# Patient Record
Sex: MALE | Race: WHITE | NOT HISPANIC OR LATINO | Employment: OTHER | URBAN - NONMETROPOLITAN AREA
[De-identification: names, ages, dates, MRNs, and addresses within clinical notes are randomized per-mention and may not be internally consistent; named-entity substitution may affect disease eponyms.]

---

## 2017-01-09 ENCOUNTER — TELEPHONE (OUTPATIENT)
Dept: FAMILY MEDICINE | Facility: OTHER | Age: 67
End: 2017-01-09

## 2017-01-09 NOTE — TELEPHONE ENCOUNTER
8:22 AM    Reason for Call: OVERBOOK    Patient is having the following symptoms: Swollen Finger for about a few days.    The patient is requesting an appointment for Tomorrow  with Dr. Velasco . PT is on the road for work and is on ly available tomorrow    Was an appointment offered for this call? Yes    Preferred method for responding to this message: Telephone Call PT's wife at  [phone]    If we cannot reach you directly, may we leave a detailed response at the number you provided? Yes    Can this message wait until your PCP/provider returns, if unavailable today? Not applicable, PCP is in    Valerie Karimi

## 2017-01-10 ENCOUNTER — OFFICE VISIT (OUTPATIENT)
Dept: FAMILY MEDICINE | Facility: OTHER | Age: 67
End: 2017-01-10
Attending: FAMILY MEDICINE
Payer: COMMERCIAL

## 2017-01-10 VITALS
SYSTOLIC BLOOD PRESSURE: 120 MMHG | BODY MASS INDEX: 26.48 KG/M2 | WEIGHT: 185 LBS | HEIGHT: 70 IN | HEART RATE: 66 BPM | TEMPERATURE: 96.6 F | DIASTOLIC BLOOD PRESSURE: 78 MMHG

## 2017-01-10 DIAGNOSIS — K50.019 CROHN'S DISEASE OF SMALL INTESTINE WITH COMPLICATION (H): ICD-10-CM

## 2017-01-10 DIAGNOSIS — M02.30 ACUTE REACTIVE ARTHRITIS (H): Primary | ICD-10-CM

## 2017-01-10 PROCEDURE — 99213 OFFICE O/P EST LOW 20 MIN: CPT | Performed by: FAMILY MEDICINE

## 2017-01-10 ASSESSMENT — PAIN SCALES - GENERAL: PAINLEVEL: NO PAIN (0)

## 2017-01-10 ASSESSMENT — ANXIETY QUESTIONNAIRES
GAD7 TOTAL SCORE: 0
1. FEELING NERVOUS, ANXIOUS, OR ON EDGE: NOT AT ALL
6. BECOMING EASILY ANNOYED OR IRRITABLE: NOT AT ALL
2. NOT BEING ABLE TO STOP OR CONTROL WORRYING: NOT AT ALL
7. FEELING AFRAID AS IF SOMETHING AWFUL MIGHT HAPPEN: NOT AT ALL
3. WORRYING TOO MUCH ABOUT DIFFERENT THINGS: NOT AT ALL
5. BEING SO RESTLESS THAT IT IS HARD TO SIT STILL: NOT AT ALL

## 2017-01-10 ASSESSMENT — PATIENT HEALTH QUESTIONNAIRE - PHQ9: 5. POOR APPETITE OR OVEREATING: NOT AT ALL

## 2017-01-10 NOTE — PROGRESS NOTES
"  SUBJECTIVE:                                                    Wilfrid Liao is a 66 year old male who presents to clinic today for the following health issues:      Musculoskeletal problem/pain      Duration: 5 days    Description  Location: left pointer finger    Intensity:  moderate    Accompanying signs and symptoms: swelling and redness    History  Previous similar problem: no   Previous evaluation:  none    Precipitating or alleviating factors:  Trauma or overuse: no   Aggravating factors include: none    Therapies tried and outcome: NSAID - advil    Pt has crohn's colitis     Started with mild stiffness and progressive got worse    No trauma but very active    Pt is right handed.    Pt is off Cimzia and will starting another injectable immunosupressor            Problem list and histories reviewed & adjusted, as indicated.  Additional history: as documented    Problem list, Medication list, Allergies, and Medical/Social/Surgical histories reviewed in EPIC and updated as appropriate.    ROS:  C: NEGATIVE for fever, chills, change in weight  R: NEGATIVE for significant cough or SOB  CV: NEGATIVE for chest pain, palpitations or peripheral edema    OBJECTIVE:                                                    /78 mmHg  Pulse 66  Temp(Src) 96.6  F (35.9  C)  Ht 5' 10\" (1.778 m)  Wt 185 lb (83.915 kg)  BMI 26.54 kg/m2  Body mass index is 26.54 kg/(m^2).   GENERAL: healthy, alert, well nourished, well hydrated, no distress  MS: extremities- left finger - tender and stiffness over PIP joint --no gross deformities noted, no edema         ASSESSMENT/PLAN:                                                        ICD-10-CM    1. Acute reactive arthritis (H) M02.30    2. Crohn's disease of small intestine with complication (H) K50.019    Discussed. Will try OTC NSAIDS.  Symptomatic treatment was discussed along when patient should call and/or come back into the clinic or go to ER/Urgent care. All questions " answered.   Discussed in length conservative measures of OTC medications for pain, Ice/Heat, elevation and the concept of R.I.C.E.. Continue behavioral changes and proper body mechanics to allow for healing. Follow up as directed.   If OTC fails- consider Indocin vs Medrol dose pack..       See Patient Instructions    Everett Velasco MD  [facility]

## 2017-01-10 NOTE — MR AVS SNAPSHOT
"              After Visit Summary   1/10/2017    Wilfrid Liao    MRN: [id number]           Patient Information     Date Of Birth          1950        Visit Information        Provider Department      1/10/2017 9:30 AM Everett Velasco MD [facility]        Today's Diagnoses     Acute reactive arthritis (H)    -  1     Crohn's disease of small intestine with complication (H)           Care Instructions    Call if not getting better...        Follow-ups after your visit        Who to contact     If you have questions or need follow up information about today's clinic visit or your schedule please contact [facility] directly at [phone].  Normal or non-critical lab and imaging results will be communicated to you by MyChart, letter or phone within 4 business days after the clinic has received the results. If you do not hear from us within 7 days, please contact the clinic through MyChart or phone. If you have a critical or abnormal lab result, we will notify you by phone as soon as possible.  Submit refill requests through [employer] or call your pharmacy and they will forward the refill request to us. Please allow 3 business days for your refill to be completed.          Additional Information About Your Visit        MyChart Information     Toovari lets you send messages to your doctor, view your test results, renew your prescriptions, schedule appointments and more. To sign up, go to [email or web address] . Click on \"Log in\" on the left side of the screen, which will take you to the Welcome page. Then click on \"Sign up Now\" on the right side of the page.     You will be asked to enter the access code listed below, as well as some personal information. Please follow the directions to create your username and password.     Your access code is: [email or web address]  Expires: 4/10/2017  9:45 AM     Your access code will  in 90 days. If you need help or a new code, please call your " "[facility] or [phone].        Care EveryWhere ID     This is your Care EveryWhere ID. This could be used by other organizations to access your [place] medical records  [id number]        Your Vitals Were     Pulse Temperature Height BMI (Body Mass Index)          66 96.6  F (35.9  C) 5' 10\" (1.778 m) 26.54 kg/m2         Blood Pressure from Last 3 Encounters:   01/10/17 120/78   02/05/16 120/70   12/24/15 137/85    Weight from Last 3 Encounters:   01/10/17 185 lb (83.915 kg)   02/05/16 200 lb (90.719 kg)   07/10/15 190 lb (86.183 kg)              Today, you had the following     No orders found for display         Today's Medication Changes          These changes are accurate as of: 1/10/17  9:45 AM.  If you have any questions, ask your nurse or doctor.               Stop taking these medicines if you haven't already. Please contact your care team if you have questions.     predniSONE 10 MG tablet   Commonly known as:  DELTASONE   Stopped by:  Everett Velasco MD                    Primary Care Provider Office Phone # Fax #    Everett Velasco [phone]       [address] [address]        Thank you!     Thank you for choosing [facility]  for your care. Our goal is always to provide you with excellent care. Hearing back from our patients is one way we can continue to improve our services. Please take a few minutes to complete the written survey that you may receive in the mail after your visit with us. Thank you!             Your Updated Medication List - Protect others around you: Learn how to safely use, store and throw away your medicines at www.disposemymeds.org.          This list is accurate as of: 1/10/17  9:45 AM.  Always use your most recent med list.                   Brand Name Dispense Instructions for use    COCONUT OIL PO          Curcumin Powd      1 tablet       cyanocobalamin 1000 MCG/ML injection    VITAMIN B12    1 mL    " Inject 1 mL (1,000 mcg) into the muscle every 30 days       lisinopril 20 MG tablet    PRINIVIL/ZESTRIL    90 tablet    TAKE ONE TABLET BY MOUTH ONCE DAILY       MULTIVITAMIN PO      Take 1 tablet by mouth daily.       PROBIOTIC FORMULA Caps      Take 1 tablet by mouth daily       VITAMIN D3 PO      Take 2,000 Units by mouth daily

## 2017-01-10 NOTE — NURSING NOTE
"Chief Complaint   Patient presents with     Arthritis       Initial /78 mmHg  Pulse 66  Temp(Src) 96.6  F (35.9  C)  Ht 5' 10\" (1.778 m)  Wt 185 lb (83.915 kg)  BMI 26.54 kg/m2 Estimated body mass index is 26.54 kg/(m^2) as calculated from the following:    Height as of this encounter: 5' 10\" (1.778 m).    Weight as of this encounter: 185 lb (83.915 kg).  BP completed using cuff size: jaida Ashton      "

## 2017-01-11 ASSESSMENT — ANXIETY QUESTIONNAIRES: GAD7 TOTAL SCORE: 0

## 2017-01-11 ASSESSMENT — PATIENT HEALTH QUESTIONNAIRE - PHQ9: SUM OF ALL RESPONSES TO PHQ QUESTIONS 1-9: 0

## 2017-02-01 ENCOUNTER — ALLIED HEALTH/NURSE VISIT (OUTPATIENT)
Dept: ALLERGY | Facility: OTHER | Age: 67
End: 2017-02-01
Attending: FAMILY MEDICINE
Payer: COMMERCIAL

## 2017-02-01 DIAGNOSIS — K50.019 CROHN'S DISEASE OF SMALL INTESTINE WITH COMPLICATION (H): Primary | ICD-10-CM

## 2017-02-01 PROCEDURE — 96372 THER/PROPH/DIAG INJ SC/IM: CPT

## 2017-02-01 NOTE — PROGRESS NOTES
The following medication was given:     MEDICATION: Vitamin B12  1000mcg  ROUTE: IM  SITE: Deltoid - Left  DOSE: 1 ml  LOT #: [id number]  :  Eat  EXPIRATION DATE:  05.2018  NDC#: 0517.0031.25  Rahel Mckeon

## 2017-02-24 ENCOUNTER — ALLIED HEALTH/NURSE VISIT (OUTPATIENT)
Dept: ALLERGY | Facility: OTHER | Age: 67
End: 2017-02-24
Attending: FAMILY MEDICINE
Payer: COMMERCIAL

## 2017-02-24 DIAGNOSIS — K50.019 CROHN'S DISEASE OF SMALL INTESTINE WITH COMPLICATION (H): Primary | ICD-10-CM

## 2017-02-24 PROCEDURE — 96372 THER/PROPH/DIAG INJ SC/IM: CPT

## 2017-02-24 NOTE — PROGRESS NOTES
The following medication was given:     MEDICATION: Vitamin B12  1000mcg  ROUTE: IM  SITE: Deltoid - Left  DOSE: 1000 mcg / 1 mL  LOT #: [id number]  :  [employer] [place]  EXPIRATION DATE:  05/2018  NDC#: 2415-7462-88    Joanne Max RN

## 2017-02-24 NOTE — MR AVS SNAPSHOT
"              After Visit Summary   2017    Wilfrid Liao    MRN: [id number]           Patient Information     Date Of Birth          1950        Visit Information        Provider Department      2017 1:00 PM HC SHOT ROOM [facility]        Today's Diagnoses     Crohn's disease of small intestine with complication (H)    -  1       Follow-ups after your visit        Who to contact     If you have questions or need follow up information about today's clinic visit or your schedule please contact [facility] directly at [phone].  Normal or non-critical lab and imaging results will be communicated to you by Medpricer.comhart, letter or phone within 4 business days after the clinic has received the results. If you do not hear from us within 7 days, please contact the clinic through [employer] or phone. If you have a critical or abnormal lab result, we will notify you by phone as soon as possible.  Submit refill requests through RingTu or call your pharmacy and they will forward the refill request to us. Please allow 3 business days for your refill to be completed.          Additional Information About Your Visit        MyChart Information     RingTu lets you send messages to your doctor, view your test results, renew your prescriptions, schedule appointments and more. To sign up, go to [email or web address] . Click on \"Log in\" on the left side of the screen, which will take you to the Welcome page. Then click on \"Sign up Now\" on the right side of the page.     You will be asked to enter the access code listed below, as well as some personal information. Please follow the directions to create your username and password.     Your access code is: [email or web address]  Expires: 4/10/2017  9:45 AM     Your access code will  in 90 days. If you need help or a new code, please call your [facility] or [phone].        Care EveryWhere ID     This is your Care EveryWhere ID. This could " be used by other organizations to access your [place] medical records  [id number]         Blood Pressure from Last 3 Encounters:   01/10/17 120/78   02/05/16 120/70   12/24/15 137/85    Weight from Last 3 Encounters:   01/10/17 185 lb (83.9 kg)   02/05/16 200 lb (90.7 kg)   07/10/15 190 lb (86.2 kg)              We Performed the Following     INJECTION INTRAMUSCULAR OR SUB-Q     VITAMIN B12 INJ /1000MCG        Primary Care Provider Office Phone # Fax #    Everett Velasco [phone]       [facility] [address]  [place] [place] [address]        Thank you!     Thank you for choosing [facility]  for your care. Our goal is always to provide you with excellent care. Hearing back from our patients is one way we can continue to improve our services. Please take a few minutes to complete the written survey that you may receive in the mail after your visit with us. Thank you!             Your Updated Medication List - Protect others around you: Learn how to safely use, store and throw away your medicines at www.disposemymeds.org.          This list is accurate as of: 2/24/17  1:15 PM.  Always use your most recent med list.                   Brand Name Dispense Instructions for use    COCONUT OIL PO          Curcumin Powd      1 tablet       cyanocobalamin 1000 MCG/ML injection    VITAMIN B12    1 mL    Inject 1 mL (1,000 mcg) into the muscle every 30 days       lisinopril 20 MG tablet    PRINIVIL/ZESTRIL    90 tablet    TAKE ONE TABLET BY MOUTH ONCE DAILY       MULTIVITAMIN PO      Take 1 tablet by mouth daily.       PROBIOTIC FORMULA Caps      Take 1 tablet by mouth daily       VITAMIN D3 PO      Take 2,000 Units by mouth daily

## 2017-03-01 ENCOUNTER — TELEPHONE (OUTPATIENT)
Dept: FAMILY MEDICINE | Facility: OTHER | Age: 67
End: 2017-03-01

## 2017-03-01 NOTE — TELEPHONE ENCOUNTER
8:17 AM    Reason for Call: OVERBOOK    Patient is having the following symptoms:  Swollen Finger for  1 week.    The patient is requesting an appointment for Thursday AM with  Dr. Velasco  Hoping to be seen around the same time as fis wife Lindy    Was an appointment offered for this call?  No    Preferred method for responding to this message: [phone]    If we cannot reach you directly, may we leave a detailed response at the number you provided? Yes      Nasreen Neil

## 2017-03-02 ENCOUNTER — OFFICE VISIT (OUTPATIENT)
Dept: FAMILY MEDICINE | Facility: OTHER | Age: 67
End: 2017-03-02
Attending: FAMILY MEDICINE
Payer: COMMERCIAL

## 2017-03-02 VITALS
TEMPERATURE: 96.8 F | HEIGHT: 70 IN | HEART RATE: 78 BPM | BODY MASS INDEX: 27.2 KG/M2 | WEIGHT: 190 LBS | DIASTOLIC BLOOD PRESSURE: 70 MMHG | SYSTOLIC BLOOD PRESSURE: 120 MMHG

## 2017-03-02 DIAGNOSIS — S69.92XA INJURY OF LEFT INDEX FINGER, INITIAL ENCOUNTER: Primary | ICD-10-CM

## 2017-03-02 DIAGNOSIS — M19.90 INFLAMMATORY ARTHRITIS: ICD-10-CM

## 2017-03-02 PROCEDURE — 73140 X-RAY EXAM OF FINGER(S): CPT | Mod: TC | Performed by: RADIOLOGY

## 2017-03-02 PROCEDURE — 99213 OFFICE O/P EST LOW 20 MIN: CPT | Performed by: FAMILY MEDICINE

## 2017-03-02 RX ORDER — PREDNISONE 20 MG/1
TABLET ORAL
Qty: 14 TABLET | Refills: 0 | Status: SHIPPED | OUTPATIENT
Start: 2017-03-02 | End: 2017-03-16

## 2017-03-02 ASSESSMENT — PAIN SCALES - GENERAL: PAINLEVEL: EXTREME PAIN (8)

## 2017-03-02 NOTE — NURSING NOTE
"Chief Complaint   Patient presents with     Hand Pain       Initial /70 (BP Location: Left arm, Patient Position: Chair, Cuff Size: Adult Regular)  Pulse 78  Temp 96.8  F (36  C)  Ht 5' 10\" (1.778 m)  Wt 190 lb (86.2 kg)  BMI 27.26 kg/m2 Estimated body mass index is 27.26 kg/(m^2) as calculated from the following:    Height as of this encounter: 5' 10\" (1.778 m).    Weight as of this encounter: 190 lb (86.2 kg).  Medication Reconciliation: complete     Francisco J Ashton      "

## 2017-03-02 NOTE — MR AVS SNAPSHOT
"              After Visit Summary   3/2/2017    Wilfrid Liao    MRN: [id number]           Patient Information     Date Of Birth          1950        Visit Information        Provider Department      3/2/2017 9:30 AM Everett Velasco MD [place] [facility]        Today's Diagnoses     Injury of left index finger, initial encounter    -  1    Inflammatory arthritis          Care Instructions    Call if not better.         Follow-ups after your visit        Who to contact     If you have questions or need follow up information about today's clinic visit or your schedule please contact [facility] [place] directly at [phone].  Normal or non-critical lab and imaging results will be communicated to you by GÃ¼dpodhart, letter or phone within 4 business days after the clinic has received the results. If you do not hear from us within 7 days, please contact the clinic through GÃ¼dpodhart or phone. If you have a critical or abnormal lab result, we will notify you by phone as soon as possible.  Submit refill requests through [employer] or call your pharmacy and they will forward the refill request to us. Please allow 3 business days for your refill to be completed.          Additional Information About Your Visit        MyChart Information     [employer] lets you send messages to your doctor, view your test results, renew your prescriptions, schedule appointments and more. To sign up, go to [email or web address] . Click on \"Log in\" on the left side of the screen, which will take you to the Welcome page. Then click on \"Sign up Now\" on the right side of the page.     You will be asked to enter the access code listed below, as well as some personal information. Please follow the directions to create your username and password.     Your access code is: [email or web address]  Expires: 4/10/2017  9:45 AM     Your access code will  in 90 days. If you need help or a new code, please call your [place] clinic or " "[phone].        Care EveryWhere ID     This is your Care EveryWhere ID. This could be used by other organizations to access your [place] medical records  [id number]        Your Vitals Were     Pulse Temperature Height BMI (Body Mass Index)          78 96.8  F (36  C) 5' 10\" (1.778 m) 27.26 kg/m2         Blood Pressure from Last 3 Encounters:   03/02/17 120/70   01/10/17 120/78   02/05/16 120/70    Weight from Last 3 Encounters:   03/02/17 190 lb (86.2 kg)   01/10/17 185 lb (83.9 kg)   02/05/16 200 lb (90.7 kg)              We Performed the Following     XR Finger Left G/E 2 Views     XR FINGER LT G/E 2 VW (Clinic Performed)     XR FINGER RT G/E 2 VW (Clinic Performed)          Today's Medication Changes          These changes are accurate as of: 3/2/17 11:00 AM.  If you have any questions, ask your nurse or doctor.               Start taking these medicines.        Dose/Directions    predniSONE 20 MG tablet   Commonly known as:  DELTASONE   Used for:  Injury of left index finger, initial encounter, Inflammatory arthritis   Started by:  Everett Velasco MD        1 tab bid for 4 days then 1 tab daily for 4 days and then 1/2 tab for 4 days.   Quantity:  14 tablet   Refills:  0            Where to get your medicines      These medications were sent to [id number] [address] [address] [address]   [address] , [place] [address]     Phone:  [phone]     predniSONE 20 MG tablet                Primary Care Provider Office Phone # Fax #    Everett Velasco [phone]       [facility] [address]  [place] [address]        Thank you!     Thank you for choosing [facility]  for your care. Our goal is always to provide you with excellent care. Hearing back from our patients is one way we can continue to improve our services. Please take a few minutes to complete the written survey that you may receive in the mail after your visit with us. Thank you!           "   Your Updated Medication List - Protect others around you: Learn how to safely use, store and throw away your medicines at www.disposemymeds.org.          This list is accurate as of: 3/2/17 11:00 AM.  Always use your most recent med list.                   Brand Name Dispense Instructions for use    COCONUT OIL PO          Curcumin Powd      1 tablet       cyanocobalamin 1000 MCG/ML injection    VITAMIN B12    1 mL    Inject 1 mL (1,000 mcg) into the muscle every 30 days       lisinopril 20 MG tablet    PRINIVIL/ZESTRIL    90 tablet    TAKE ONE TABLET BY MOUTH ONCE DAILY       MULTIVITAMIN PO      Take 1 tablet by mouth daily.       predniSONE 20 MG tablet    DELTASONE    14 tablet    1 tab bid for 4 days then 1 tab daily for 4 days and then 1/2 tab for 4 days.       PROBIOTIC FORMULA Caps      Take 1 tablet by mouth daily       VITAMIN D3 PO      Take 2,000 Units by mouth daily

## 2017-03-02 NOTE — PROGRESS NOTES
"  SUBJECTIVE:                                                    Wilfrid Liao is a 66 year old male who presents to clinic today for the following health issues:      Musculoskeletal problem/pain      Duration: months    Description  Location: left finger    Intensity:  moderate    Accompanying signs and symptoms: warmth and swelling    History  Previous similar problem: no   Previous evaluation:  none    Precipitating or alleviating factors:  Trauma or overuse: no   Aggravating factors include: none    Therapies tried and outcome: heat and ice    Has been on and off swollen and red/tender     Has Crohns- -reactive arthitis           Problem list and histories reviewed & adjusted, as indicated.  Additional history: as documented        Reviewed and updated as needed this visit by clinical staff  Tobacco  Allergies  Meds  Med Hx  Surg Hx  Fam Hx  Soc Hx      Reviewed and updated as needed this visit by Provider         ROS:  C: NEGATIVE for fever, chills, change in weight    OBJECTIVE:                                                    /70 (BP Location: Left arm, Patient Position: Chair, Cuff Size: Adult Regular)  Pulse 78  Temp 96.8  F (36  C)  Ht 5' 10\" (1.778 m)  Wt 190 lb (86.2 kg)  BMI 27.26 kg/m2  Body mass index is 27.26 kg/(m^2).   GENERAL: healthy, alert, well nourished, well hydrated, no distress  MS: left index finger  -  Verytender over PIP and mcp- swelling and decrease ROM , warm to touch. no gross deformities noted, no edema    XR- negative      ASSESSMENT/PLAN:                                                        ICD-10-CM    1. Injury of left index finger, initial encounter S69.92XA XR Finger Left G/E 2 Views     XR FINGER RT G/E 2 VW (Clinic Performed)     XR FINGER LT G/E 2 VW (Clinic Performed)     predniSONE (DELTASONE) 20 MG tablet   2. Inflammatory arthritis M19.90 predniSONE (DELTASONE) 20 MG tablet     Discussed -  Probable inflammatory arthritis from Crohns.  Risk and " benefits of steroid was discussed and verbal consent to proceed was given.   If fails - see ortho for injection . Symptomatic treatment was discussed along when patient should call and/or come back into the clinic or go to ER/Urgent care. All questions answered.   See Patient Instructions    Everett Velasco MD  [facility]

## 2017-03-14 ENCOUNTER — TELEPHONE (OUTPATIENT)
Dept: FAMILY MEDICINE | Facility: OTHER | Age: 67
End: 2017-03-14

## 2017-03-14 NOTE — TELEPHONE ENCOUNTER
8:27 AM    Reason for Call: OVERBOOK    Patient is having the following symptoms:  Bruising on surgery scar from coughing for   1 week    The patient is requesting an appointment for   Thursday or Friday  with  Dr. Velasco    Was an appointment offered for this call?  No    Preferred method for responding to this message: [phone]    If we cannot reach you directly, may we leave a detailed response at the number you provided?  Yes    Nasreen Neil

## 2017-03-16 ENCOUNTER — OFFICE VISIT (OUTPATIENT)
Dept: FAMILY MEDICINE | Facility: OTHER | Age: 67
End: 2017-03-16
Attending: FAMILY MEDICINE
Payer: COMMERCIAL

## 2017-03-16 VITALS
BODY MASS INDEX: 27.84 KG/M2 | SYSTOLIC BLOOD PRESSURE: 124 MMHG | HEART RATE: 81 BPM | OXYGEN SATURATION: 97 % | TEMPERATURE: 96.8 F | RESPIRATION RATE: 17 BRPM | DIASTOLIC BLOOD PRESSURE: 72 MMHG | WEIGHT: 194 LBS

## 2017-03-16 DIAGNOSIS — S39.011A ABDOMINAL MUSCLE STRAIN, INITIAL ENCOUNTER: ICD-10-CM

## 2017-03-16 DIAGNOSIS — J20.9 ACUTE BRONCHITIS, UNSPECIFIED ORGANISM: Primary | ICD-10-CM

## 2017-03-16 PROCEDURE — 99213 OFFICE O/P EST LOW 20 MIN: CPT | Performed by: FAMILY MEDICINE

## 2017-03-16 RX ORDER — AZITHROMYCIN 250 MG/1
TABLET, FILM COATED ORAL
Qty: 6 TABLET | Refills: 0 | Status: SHIPPED | OUTPATIENT
Start: 2017-03-16

## 2017-03-16 ASSESSMENT — PAIN SCALES - GENERAL: PAINLEVEL: NO PAIN (0)

## 2017-03-16 NOTE — MR AVS SNAPSHOT
"              After Visit Summary   3/16/2017    Wilfrid Liao    MRN: [id number]           Patient Information     Date Of Birth          1950        Visit Information        Provider Department      3/16/2017 11:00 AM Everett Velasco MD [facility]        Today's Diagnoses     Acute bronchitis, unspecified organism    -  1    Abdominal muscle strain, initial encounter          Care Instructions    Return if worsen         Follow-ups after your visit        Who to contact     If you have questions or need follow up information about today's clinic visit or your schedule please contact [facility] [place] directly at [phone].  Normal or non-critical lab and imaging results will be communicated to you by newScalehart, letter or phone within 4 business days after the clinic has received the results. If you do not hear from us within 7 days, please contact the clinic through newScalehart or phone. If you have a critical or abnormal lab result, we will notify you by phone as soon as possible.  Submit refill requests through [employer] or call your pharmacy and they will forward the refill request to us. Please allow 3 business days for your refill to be completed.          Additional Information About Your Visit        MyChart Information     Austin-Tetra lets you send messages to your doctor, view your test results, renew your prescriptions, schedule appointments and more. To sign up, go to [email or web address] . Click on \"Log in\" on the left side of the screen, which will take you to the Welcome page. Then click on \"Sign up Now\" on the right side of the page.     You will be asked to enter the access code listed below, as well as some personal information. Please follow the directions to create your username and password.     Your access code is: [email or web address]  Expires: 4/10/2017 10:45 AM     Your access code will  in 90 days. If you need help or a new code, please call your [facility] or " [phone].        Care EveryWhere ID     This is your Care EveryWhere ID. This could be used by other organizations to access your [place] medical records  [id number]        Your Vitals Were     Pulse Temperature Respirations Pulse Oximetry BMI (Body Mass Index)       81 96.8  F (36  C) 17 97% 27.84 kg/m2        Blood Pressure from Last 3 Encounters:   03/16/17 124/72   03/02/17 120/70   01/10/17 120/78    Weight from Last 3 Encounters:   03/16/17 194 lb (88 kg)   03/02/17 190 lb (86.2 kg)   01/10/17 185 lb (83.9 kg)              Today, you had the following     No orders found for display         Today's Medication Changes          These changes are accurate as of: 3/16/17 11:59 AM.  If you have any questions, ask your nurse or doctor.               Start taking these medicines.        Dose/Directions    azithromycin 250 MG tablet   Commonly known as:  ZITHROMAX   Used for:  Acute bronchitis, unspecified organism   Started by:  Everett Velasco MD        As directed   Quantity:  6 tablet   Refills:  0            Where to get your medicines      These medications were sent to [id number] - [place], [place] - [address]   [id number] , [place] [address]     Phone:  [phone]     azithromycin 250 MG tablet                Primary Care Provider Office Phone # Fax #    Everett Velasco [phone]       [facility] [address] [place] [address]  [place] [address]        Thank you!     Thank you for choosing [facility]  for your care. Our goal is always to provide you with excellent care. Hearing back from our patients is one way we can continue to improve our services. Please take a few minutes to complete the written survey that you may receive in the mail after your visit with us. Thank you!             Your Updated Medication List - Protect others around you: Learn how to safely use, store and throw away your medicines at www.disposemymeds.org.          This list is  accurate as of: 3/16/17 11:59 AM.  Always use your most recent med list.                   Brand Name Dispense Instructions for use    azithromycin 250 MG tablet    ZITHROMAX    6 tablet    As directed       COCONUT OIL PO          Curcumin Powd      1 tablet       cyanocobalamin 1000 MCG/ML injection    VITAMIN B12    1 mL    Inject 1 mL (1,000 mcg) into the muscle every 30 days       lisinopril 20 MG tablet    PRINIVIL/ZESTRIL    90 tablet    TAKE ONE TABLET BY MOUTH ONCE DAILY       MULTIVITAMIN PO      Take 1 tablet by mouth daily.       PROBIOTIC FORMULA Caps      Take 1 tablet by mouth daily       VITAMIN D (CHOLECALCIFEROL) PO      Take 2,000 Units by mouth daily

## 2017-03-16 NOTE — PROGRESS NOTES
SUBJECTIVE:                                                    Wilfrid Liao is a 66 year old male who presents to clinic today for the following health issues:        RESPIRATORY SYMPTOMS      Duration: 2 weeks     Description  nasal congestion, rhinorrhea, cough and bruising in chest     Severity: mild    Accompanying signs and symptoms: None    History (predisposing factors):  none    Precipitating or alleviating factors: None    Therapies tried and outcome:  robitussin     Area of lap ports for GB removal- large amount of bruising after terrible coughing spell    Before bruising - felt tearing sensation               Problem list and histories reviewed & adjusted, as indicated.  Additional history: as documented        ROS:  C: NEGATIVE for fever, chills, change in weight  CV: NEGATIVE for chest pain, palpitations or peripheral edema    OBJECTIVE:                                                    /72  Pulse 81  Temp 96.8  F (36  C)  Resp 17  Wt 194 lb (88 kg)  SpO2 97%  BMI 27.84 kg/m2  Body mass index is 27.84 kg/(m^2).   GENERAL: healthy, alert, well nourished, well hydrated, no distress  HENT: ear canals- normal; TMs- normal; Nose- normal; Mouth- no ulcers, no lesions  NECK: no tenderness, no adenopathy, no asymmetry, no masses, no stiffness; thyroid- normal to palpation  RESP: lungs not clear to auscultation - no rales, diffuse lower bilateral  rhonchi, no wheezes  Abdomen- bruising and hematoma size of golf ball over previous GB incisional port        ASSESSMENT/PLAN:                                                    (J20.9) Acute bronchitis, unspecified organism  (primary encounter diagnosis)  Comment: Will treat.   Plan: azithromycin (ZITHROMAX) 250 MG tablet        Symptomatic treatment was discussed along what is available for OTC medications for symptomatic relief.   Symptomatic treatment was discussed along when patient should call and/or come back into the clinic or go to ER/Urgent care.  All questions answered.       (S39.011A) Abdominal muscle strain, initial encounter  Comment: with hematoma- no hernia felt though still might be there   Plan: Symptomatic treatment was discussed along when patient should call and/or come back into the clinic or go to ER/Urgent care. All questions answered.           See Patient Instructions    Everett Velasco MD  [facility]

## 2017-03-16 NOTE — NURSING NOTE
"No chief complaint on file.      Initial /72  Pulse 81  Temp 96.8  F (36  C)  Resp 17  Wt 194 lb (88 kg)  SpO2 97%  BMI 27.84 kg/m2 Estimated body mass index is 27.84 kg/(m^2) as calculated from the following:    Height as of 3/2/17: 5' 10\" (1.778 m).    Weight as of this encounter: 194 lb (88 kg).  Medication Reconciliation: complete  "

## 2017-03-30 ENCOUNTER — OFFICE VISIT (OUTPATIENT)
Dept: CHIROPRACTIC MEDICINE | Facility: OTHER | Age: 67
End: 2017-03-30
Attending: CHIROPRACTOR
Payer: COMMERCIAL

## 2017-03-30 DIAGNOSIS — M99.02 SEGMENTAL AND SOMATIC DYSFUNCTION OF THORACIC REGION: ICD-10-CM

## 2017-03-30 DIAGNOSIS — M54.50 ACUTE BILATERAL LOW BACK PAIN WITHOUT SCIATICA: ICD-10-CM

## 2017-03-30 DIAGNOSIS — M99.03 SEGMENTAL AND SOMATIC DYSFUNCTION OF LUMBAR REGION: Primary | ICD-10-CM

## 2017-03-30 PROCEDURE — 98940 CHIROPRACT MANJ 1-2 REGIONS: CPT | Performed by: CHIROPRACTOR

## 2017-03-30 NOTE — MR AVS SNAPSHOT
"              After Visit Summary   3/30/2017    Wilfrid Liao    MRN: [id number]           Patient Information     Date Of Birth          1950        Visit Information        Provider Department      3/30/2017 4:10 PM Jose Marlow DC  [facility]        Today's Diagnoses     Segmental and somatic dysfunction of lumbar region    -  1    Acute bilateral low back pain without sciatica        Segmental and somatic dysfunction of thoracic region           Follow-ups after your visit        Who to contact     If you have questions or need follow up information about today's clinic visit or your schedule please [facility] at [phone].  Normal or non-critical lab and imaging results will be communicated to you by One Africa Mediahart, letter or phone within 4 business days after the clinic has received the results. If you do not hear from us within 7 days, please contact the clinic through One Africa Mediahart or phone. If you have a critical or abnormal lab result, we will notify you by phone as soon as possible.  Submit refill requests through Gre[employer] Software or call your pharmacy and they will forward the refill request to us. Please allow 3 business days for your refill to be completed.          Additional Information About Your Visit        MyChart Information     Greytip Software lets you send messages to your doctor, view your test results, renew your prescriptions, schedule appointments and more. To sign up, go to [email or web address] . Click on \"Log in\" on the left side of the screen, which will take you to the Welcome page. Then click on \"Sign up Now\" on the right side of the page.     You will be asked to enter the access code listed below, as well as some personal information. Please follow the directions to create your username and password.     Your access code is: [email or web address]  Expires: 4/10/2017 10:45 AM     Your access code will  in 90 days. If you need help or a new code, please call " your [place] clinic or [phone].        Care EveryWhere ID     This is your Care EveryWhere ID. This could be used by other organizations to access your [place] medical records  [id number]         Blood Pressure from Last 3 Encounters:   03/16/17 124/72   03/02/17 120/70   01/10/17 120/78    Weight from Last 3 Encounters:   03/16/17 194 lb (88 kg)   03/02/17 190 lb (86.2 kg)   01/10/17 185 lb (83.9 kg)              We Performed the Following     CHIROPRAC MANIP,SPINAL,1-2 REGIONS        Primary Care Provider Office Phone # Fax #    Everett Velasco [phone]       [facility] [address] [place] [address]        Thank you!     Thank you for choosing  [facility] [place] [facility]  for your care. Our goal is always to provide you with excellent care. Hearing back from our patients is one way we can continue to improve our services. Please take a few minutes to complete the written survey that you may receive in the mail after your visit with us. Thank you!             Your Updated Medication List - Protect others around you: Learn how to safely use, store and throw away your medicines at www.disposemymeds.org.          This list is accurate as of: 3/30/17 11:59 PM.  Always use your most recent med list.                   Brand Name Dispense Instructions for use    azithromycin 250 MG tablet    ZITHROMAX    6 tablet    As directed       COCONUT OIL PO          Curcumin Powd      1 tablet       cyanocobalamin 1000 MCG/ML injection    VITAMIN B12    1 mL    Inject 1 mL (1,000 mcg) into the muscle every 30 days       lisinopril 20 MG tablet    PRINIVIL/ZESTRIL    90 tablet    TAKE ONE TABLET BY MOUTH ONCE DAILY       MULTIVITAMIN PO      Take 1 tablet by mouth daily.       PROBIOTIC FORMULA Caps      Take 1 tablet by mouth daily       VITAMIN D (CHOLECALCIFEROL) PO      Take 2,000 Units by mouth daily

## 2017-04-03 NOTE — PROGRESS NOTES
Subjective Finding:    Chief compalint: Patient presents with:  Back Pain  , Pain Scale: 2/10, Intensity: dull and ache, Duration: 10 days, Change since last visit: , Radiating: no.    Date of injury:     Activities that the pain restricts:   Home/household/hobbies/social activities: no.  Work duties: no.  Sleep: no.  Makes symptoms better: rest.  Makes symptoms worse: thoracic extension, thoracic flexion and sitting for long periods.  Have you seen anyone else for the symptoms? No.  Work related: no.  Automobile related injury: no.    Objective and Assessment:    Posture Analysis:   High shoulder: right.  Head tilt: right.  High iliac crest: left.  Head carriage: forward.  Thoracic Kyphosis: neutral.  Lumbar Lordosis: forward.    Lumbar Range of Motion: extension decreased.  Cervical Range of Motion: flexion decreased and extension decreased.  Thoracic Range of Motion: .  Extremity Range of Motion: .    Palpation:   Lumbar pain      Segmental dysfunction pre-treatment:   T5     L4-5    Assessment post-treatment:  Cervical: ROM increased.  Thoracic: ROM increased and pain and tenderness decreased.  Lumbar: ROM increased.    Comments: .      Complicating Factors: .    Plan / Procedure:    Expected release date: .  Treatment plan: PRN.  Instructed patient: ice 20 minutes every other hour as needed and walk 10 minutes.  Short term goals: reduce pain.  Long term goals: restore normal function.  Prognosis: excellent.

## 2017-04-05 DIAGNOSIS — K50.019 CROHN'S DISEASE OF SMALL INTESTINE WITH COMPLICATION (H): ICD-10-CM

## 2017-04-05 DIAGNOSIS — K50.00 CROHN'S DISEASE OF SMALL INTESTINE (H): Primary | ICD-10-CM

## 2017-04-05 RX ORDER — CYANOCOBALAMIN 1000 UG/ML
INJECTION, SOLUTION INTRAMUSCULAR; SUBCUTANEOUS
Qty: 1 ML | Refills: 10 | OUTPATIENT
Start: 2017-04-05

## 2017-04-05 NOTE — TELEPHONE ENCOUNTER
Last office visit: 3.16.17 - Patient's B12 injection is .  Pended new order. Please review and sign if appropriate. Thank you

## 2017-04-06 ENCOUNTER — ALLIED HEALTH/NURSE VISIT (OUTPATIENT)
Dept: ALLERGY | Facility: OTHER | Age: 67
End: 2017-04-06
Attending: FAMILY MEDICINE
Payer: COMMERCIAL

## 2017-04-06 DIAGNOSIS — K50.019 CROHN'S DISEASE OF SMALL INTESTINE WITH COMPLICATION (H): Primary | ICD-10-CM

## 2017-04-06 PROCEDURE — 96372 THER/PROPH/DIAG INJ SC/IM: CPT

## 2017-04-06 NOTE — PROGRESS NOTES
Prior to injection verified patient identity using patient's name and date of birth.  Per orders of Dr. Velasco, injection of B12 given by Tita Salgado. Patient instructed to remain in clinic for 20 minutes afterwards, and to report any adverse reaction to me immediately. Patient signed waiver and left AMA.  Tita Salgado LPN

## 2017-04-06 NOTE — MR AVS SNAPSHOT
"              After Visit Summary   2017    Wilfrid Liao    MRN: [id number]           Patient Information     Date Of Birth          1950        Visit Information        Provider Department      2017 10:00 AM HC SHOT ROOM [facility]        Today's Diagnoses     Crohn's disease of small intestine with complication (H)    -  1       Follow-ups after your visit        Who to contact     If you have questions or need follow up information about today's clinic visit or your schedule please contact [facility] directly at [phone].  Normal or non-critical lab and imaging results will be communicated to you by Supersonichart, letter or phone within 4 business days after the clinic has received the results. If you do not hear from us within 7 days, please contact the clinic through ParLevel Systemst or phone. If you have a critical or abnormal lab result, we will notify you by phone as soon as possible.  Submit refill requests through TwitJump or call your pharmacy and they will forward the refill request to us. Please allow 3 business days for your refill to be completed.          Additional Information About Your Visit        MyChart Information     TwitJump lets you send messages to your doctor, view your test results, renew your prescriptions, schedule appointments and more. To sign up, go to [email or web address] . Click on \"Log in\" on the left side of the screen, which will take you to the Welcome page. Then click on \"Sign up Now\" on the right side of the page.     You will be asked to enter the access code listed below, as well as some personal information. Please follow the directions to create your username and password.     Your access code is: [email or web address]  Expires: 4/10/2017 10:45 AM     Your access code will  in 90 days. If you need help or a new code, please call your [facility] or [phone].        Care EveryWhere ID     This is your Care EveryWhere ID. This could " be used by other organizations to access your [place] medical records  [id number]         Blood Pressure from Last 3 Encounters:   03/16/17 124/72   03/02/17 120/70   01/10/17 120/78    Weight from Last 3 Encounters:   03/16/17 194 lb (88 kg)   03/02/17 190 lb (86.2 kg)   01/10/17 185 lb (83.9 kg)              Today, you had the following     No orders found for display       Primary Care Provider Office Phone # Fax #    Everett Velasco [phone]       [facility] [address] [facility] [place] [address]        Thank you!     Thank you for choosing [facility]  for your care. Our goal is always to provide you with excellent care. Hearing back from our patients is one way we can continue to improve our services. Please take a few minutes to complete the written survey that you may receive in the mail after your visit with us. Thank you!             Your Updated Medication List - Protect others around you: Learn how to safely use, store and throw away your medicines at www.disposemymeds.org.          This list is accurate as of: 4/6/17 10:18 AM.  Always use your most recent med list.                   Brand Name Dispense Instructions for use    azithromycin 250 MG tablet    ZITHROMAX    6 tablet    As directed       COCONUT OIL PO          Curcumin Powd      1 tablet       cyanocobalamin 1000 MCG/ML injection    VITAMIN B12    1 mL    Inject 1 mL (1,000 mcg) into the muscle every 30 days       lisinopril 20 MG tablet    PRINIVIL/ZESTRIL    90 tablet    TAKE ONE TABLET BY MOUTH ONCE DAILY       MULTIVITAMIN PO      Take 1 tablet by mouth daily.       PROBIOTIC FORMULA Caps      Take 1 tablet by mouth daily       VITAMIN D (CHOLECALCIFEROL) PO      Take 2,000 Units by mouth daily

## 2017-04-06 NOTE — NURSING NOTE
The following medication was given:     MEDICATION: Vitamin B12  1000mcg  ROUTE: IM  SITE: Deltoid - Left  DOSE: 1 ml  LOT #: [id number]  :  [employer]  EXPIRATION DATE:  7/2018  NDC#: 8085-1010-55  Injection was given in Left arm again as right arm had a rash.  Tita Salgado LPN

## 2017-05-03 ENCOUNTER — ALLIED HEALTH/NURSE VISIT (OUTPATIENT)
Dept: ALLERGY | Facility: OTHER | Age: 67
End: 2017-05-03
Attending: FAMILY MEDICINE
Payer: COMMERCIAL

## 2017-05-03 ENCOUNTER — OFFICE VISIT (OUTPATIENT)
Dept: CHIROPRACTIC MEDICINE | Facility: OTHER | Age: 67
End: 2017-05-03
Attending: CHIROPRACTOR
Payer: COMMERCIAL

## 2017-05-03 DIAGNOSIS — M99.01 SEGMENTAL AND SOMATIC DYSFUNCTION OF CERVICAL REGION: ICD-10-CM

## 2017-05-03 DIAGNOSIS — M99.03 SEGMENTAL AND SOMATIC DYSFUNCTION OF LUMBAR REGION: Primary | ICD-10-CM

## 2017-05-03 DIAGNOSIS — M54.50 ACUTE BILATERAL LOW BACK PAIN WITHOUT SCIATICA: ICD-10-CM

## 2017-05-03 DIAGNOSIS — M99.02 SEGMENTAL AND SOMATIC DYSFUNCTION OF THORACIC REGION: ICD-10-CM

## 2017-05-03 DIAGNOSIS — K50.019 CROHN'S DISEASE OF SMALL INTESTINE WITH COMPLICATION (H): Primary | ICD-10-CM

## 2017-05-03 PROCEDURE — 96372 THER/PROPH/DIAG INJ SC/IM: CPT

## 2017-05-03 PROCEDURE — 98941 CHIROPRACT MANJ 3-4 REGIONS: CPT | Performed by: CHIROPRACTOR

## 2017-05-03 NOTE — MR AVS SNAPSHOT
"              After Visit Summary   5/3/2017    Wilfrid Liao    MRN: [id number]           Patient Information     Date Of Birth          1950        Visit Information        Provider Department      5/3/2017 10:10 AM [facility]        Today's Diagnoses     Segmental and somatic dysfunction of lumbar region    -  1    Acute bilateral low back pain without sciatica        Segmental and somatic dysfunction of thoracic region        Segmental and somatic dysfunction of cervical region           Follow-ups after your visit        Who to contact     If you have questions or need follow up information about today's clinic visit or your schedule please contact  [facility] directly at [phone].  Normal or non-critical lab and imaging results will be communicated to you by [employer] Building Maintenancehart, letter or phone within 4 business days after the clinic has received the results. If you do not hear from us within 7 days, please contact the clinic through MyChart or phone. If you have a critical or abnormal lab result, we will notify you by phone as soon as possible.  Submit refill requests through Programeter or call your pharmacy and they will forward the refill request to us. Please allow 3 business days for your refill to be completed.          Additional Information About Your Visit        MyChart Information     Programeter lets you send messages to your doctor, view your test results, renew your prescriptions, schedule appointments and more. To sign up, go to [email or web address] . Click on \"Log in\" on the left side of the screen, which will take you to the Welcome page. Then click on \"Sign up Now\" on the right side of the page.     You will be asked to enter the access code listed below, as well as some personal information. Please follow the directions to create your username and password.     Your access code is: [email or web address]  Expires: 8/1/2017  9:18 AM     Your access code will "  in 90 days. If you need help or a new code, please call your [place] clinic or [phone].        Care EveryWhere ID     This is your Care EveryWhere ID. This could be used by other organizations to access your [place] medical records  [id number]         Blood Pressure from Last 3 Encounters:   17 124/72   17 120/70   01/10/17 120/78    Weight from Last 3 Encounters:   17 194 lb (88 kg)   17 190 lb (86.2 kg)   01/10/17 185 lb (83.9 kg)              We Performed the Following     CHIROPRAC MANIP,SPINAL,3-4 REGIONS        Primary Care Provider Office Phone # Fax #    Everett Velasco [phone]       [facility] [address] [facility]  [facility] [address]        Thank you!     [facility] you [facility]  [facility]  [facility]. Our goal is always to provide you with excellent care. Hearing back from our patients is one way we can continue to improve our services. Please take a few minutes to complete the written survey that you may receive in the mail after your visit with us. Thank you!             Your Updated Medication List - Protect others around you: Learn how to safely use, store and throw away your medicines at www.disposemymeds.org.          This list is accurate as of: 5/3/17 11:59 PM.  Always use your most recent med list.                   Brand Name Dispense Instructions for use    azithromycin 250 MG tablet    ZITHROMAX    6 tablet    As directed       COCONUT OIL PO          Curcumin Powd      1 tablet       cyanocobalamin 1000 MCG/ML injection    VITAMIN B12    1 mL    Inject 1 mL (1,000 mcg) into the muscle every 30 days       lisinopril 20 MG tablet    PRINIVIL/ZESTRIL    90 tablet    TAKE ONE TABLET BY MOUTH ONCE DAILY       MULTIVITAMIN PO      Take 1 tablet by mouth daily.       PROBIOTIC FORMULA Caps      Take 1 tablet by mouth daily       VITAMIN D (CHOLECALCIFEROL) PO      Take 2,000 Units by mouth daily

## 2017-05-03 NOTE — MR AVS SNAPSHOT
"              After Visit Summary   5/3/2017    Wilfrid Liao    MRN: [id number]           Patient Information     Date Of Birth          1950        Visit Information        Provider Department      5/3/2017 9:00 AM HC SHOT ROOM [facility]        Today's Diagnoses     Crohn's disease of small intestine with complication (H)    -  1       Follow-ups after your visit        Your next 10 appointments already scheduled     May 03, 2017 10:10 AM CDT   Return Visit with Jose Marlow DC   [facility] ([facility])    [address]  [facility] [address]   [phone]              Who to contact     If you have questions or need follow up information about today's clinic visit or your schedule please contact [facility] directly at [phone].  Normal or non-critical lab and imaging results will be communicated to you by MyChart, letter or phone within 4 business days after the clinic has received the results. If you do not hear from us within 7 days, please contact the clinic through MyChart or phone. If you have a critical or abnormal lab result, we will notify you by phone as soon as possible.  Submit refill requests through [employer] or call your pharmacy and they will forward the refill request to us. Please allow 3 business days for your refill to be completed.          Additional Information About Your Visit        MyChart Information     [employer] lets you send messages to your doctor, view your test results, renew your prescriptions, schedule appointments and more. To sign up, go to [email or web address] . Click on \"Log in\" on the left side of the screen, which will take you to the Welcome page. Then click on \"Sign up Now\" on the right side of the page.     You will be asked to enter the access code listed below, as well as some personal information. Please follow the directions to create your username and password.     Your access code is: " [email or web address]  Expires: 2017  9:18 AM     Your access code will  in 90 days. If you need help or a new code, please call your [place] clinic or [phone].        Care EveryWhere ID     This is your Care EveryWhere ID. This could be used by other organizations to access your [place] medical records  [id number]         Blood Pressure from Last 3 Encounters:   17 124/72   17 120/70   01/10/17 120/78    Weight from Last 3 Encounters:   17 194 lb (88 kg)   17 190 lb (86.2 kg)   01/10/17 185 lb (83.9 kg)              We Performed the Following     INJECTION INTRAMUSCULAR OR SUB-Q     Medroxyprogesterone inj  1mg   (Depo Provera J-Code)        Primary Care Provider Office Phone # Fax #    Everett Velasco [phone]       [address]  [place] [address]        Thank you!     Thank you for choosing [facility]  for your care. Our goal is always to provide you with excellent care. Hearing back from our patients is one way we can continue to improve our services. Please take a few minutes to complete the written survey that you may receive in the mail after your visit with us. Thank you!             Your Updated Medication List - Protect others around you: Learn how to safely use, store and throw away your medicines at www.disposemymeds.org.          This list is accurate as of: 5/3/17  9:18 AM.  Always use your most recent med list.                   Brand Name Dispense Instructions for use    azithromycin 250 MG tablet    ZITHROMAX    6 tablet    As directed       COCONUT OIL PO          Curcumin Powd      1 tablet       cyanocobalamin 1000 MCG/ML injection    VITAMIN B12    1 mL    Inject 1 mL (1,000 mcg) into the muscle every 30 days       lisinopril 20 MG tablet    PRINIVIL/ZESTRIL    90 tablet    TAKE ONE TABLET BY MOUTH ONCE DAILY       MULTIVITAMIN PO      Take 1 tablet by mouth daily.       PROBIOTIC FORMULA Caps      Take  1 tablet by mouth daily       VITAMIN D (CHOLECALCIFEROL) PO      Take 2,000 Units by mouth daily

## 2017-05-03 NOTE — PROGRESS NOTES
The following medication was given:     MEDICATION: Vitamin B12  1000mcg  ROUTE: IM  SITE: Deltoid - Right  DOSE: 1ml  LOT #: [id number]  :  [employer]  EXPIRATION DATE:  07/2018  NDC#: 9090-7001-33    Roxann Combs LPN

## 2017-05-03 NOTE — NURSING NOTE
Prior to injection verified patient identity using patient's name and date of birth.  Per orders of Dr. cole, injection of B-12 given by Roxann Combs. Patient instructed to remain in clinic for 20 minutes afterwards, and to report any adverse reaction to me immediately.      Roxann Combs LPN

## 2017-05-04 NOTE — PROGRESS NOTES
Subjective Finding:    Chief compalint: Patient presents with:  Back Pain  Neck Pain  , Pain Scale: 2/10, Intensity: dull and ache, Duration: 10 days, Change since last visit: , Radiating: no.    Date of injury:     Activities that the pain restricts:   Home/household/hobbies/social activities: no.  Work duties: no.  Sleep: no.  Makes symptoms better: rest.  Makes symptoms worse: thoracic extension, thoracic flexion and sitting for long periods.  Have you seen anyone else for the symptoms? No.  Work related: no.  Automobile related injury: no.    Objective and Assessment:    Posture Analysis:   High shoulder: right.  Head tilt: right.  High iliac crest: left.  Head carriage: forward.  Thoracic Kyphosis: neutral.  Lumbar Lordosis: forward.    Lumbar Range of Motion: extension decreased.  Cervical Range of Motion: flexion decreased and extension decreased.  Thoracic Range of Motion: .  Extremity Range of Motion: .    Palpation:   Lumbar pain      Segmental dysfunction pre-treatment:   T5     L4-5  C56    Assessment post-treatment:  Cervical: ROM increased.  Thoracic: ROM increased and pain and tenderness decreased.  Lumbar: ROM increased.    Comments: .      Complicating Factors: .    Plan / Procedure:    Expected release date: .  Treatment plan: PRN.  Instructed patient: ice 20 minutes every other hour as needed and walk 10 minutes.  Short term goals: reduce pain.  Long term goals: restore normal function.  Prognosis: excellent.

## 2017-05-10 ENCOUNTER — OFFICE VISIT (OUTPATIENT)
Dept: CHIROPRACTIC MEDICINE | Facility: OTHER | Age: 67
End: 2017-05-10
Attending: CHIROPRACTOR
Payer: COMMERCIAL

## 2017-05-10 DIAGNOSIS — M99.02 SEGMENTAL AND SOMATIC DYSFUNCTION OF THORACIC REGION: ICD-10-CM

## 2017-05-10 DIAGNOSIS — M54.2 CERVICALGIA: ICD-10-CM

## 2017-05-10 DIAGNOSIS — M99.03 SEGMENTAL AND SOMATIC DYSFUNCTION OF LUMBAR REGION: ICD-10-CM

## 2017-05-10 DIAGNOSIS — M99.01 SEGMENTAL AND SOMATIC DYSFUNCTION OF CERVICAL REGION: Primary | ICD-10-CM

## 2017-05-10 PROCEDURE — 98940 CHIROPRACT MANJ 1-2 REGIONS: CPT | Performed by: CHIROPRACTOR

## 2017-05-10 NOTE — MR AVS SNAPSHOT
"              After Visit Summary   5/10/2017    Wilfrid Liao    MRN: [id number]           Patient Information     Date Of Birth          1950        Visit Information        Provider Department      5/10/2017 10:40 AM Jose Marlow DC  [facility] [place]        Today's Diagnoses     Segmental and somatic dysfunction of cervical region    -  1    Cervicalgia        Segmental and somatic dysfunction of lumbar region        Segmental and somatic dysfunction of thoracic region           Follow-ups after your visit        Who to contact     If you have questions or need follow up information about today's clinic visit or your schedule please contact  [facility] [place] directly at [phone].  Normal or non-critical lab and imaging results will be communicated to you by [employer], letter or phone within 4 business days after the clinic has received the results. If you do not hear from us within 7 days, please contact the clinic through [employer] or phone. If you have a critical or abnormal lab result, we will notify you by phone as soon as possible.  Submit refill requests through [employer] or call your pharmacy and they will forward the refill request to us. Please allow 3 business days for your refill to be completed.          Additional Information About Your Visit        MyChart Information     [employer] lets you send messages to your doctor, view your test results, renew your prescriptions, schedule appointments and more. To sign up, go to [email or web address] . Click on \"Log in\" on the left side of the screen, which will take you to the Welcome page. Then click on \"Sign up Now\" on the right side of the page.     You will be asked to enter the access code listed below, as well as some personal information. Please follow the directions to create your username and password.     Your access code is: [email or web address]  Expires: 2017  9:18 AM     Your access code will  in 90 days. If you need help " or a new code, please call your [place] clinic or [phone].        Care EveryWhere ID     This is your Care EveryWhere ID. This could be used by other organizations to access your [place] medical records  [id number]         Blood Pressure from Last 3 Encounters:   03/16/17 124/72   03/02/17 120/70   01/10/17 120/78    Weight from Last 3 Encounters:   03/16/17 194 lb (88 kg)   03/02/17 190 lb (86.2 kg)   01/10/17 185 lb (83.9 kg)              We Performed the Following     CHIROPRAC MANIP,SPINAL,1-2 REGIONS        Primary Care Provider Office Phone # Fax #    Everett Velasco [phone]       [facility] [address] [place] [address]        Thank you!     Thank you for choosing  [facility] [place] [facility]  for your care. Our goal is always to provide you with excellent care. Hearing back from our patients is one way we can continue to improve our services. Please take a few minutes to complete the written survey that you may receive in the mail after your visit with us. Thank you!             Your Updated Medication List - Protect others around you: Learn how to safely use, store and throw away your medicines at www.disposemymeds.org.          This list is accurate as of: 5/10/17  1:54 PM.  Always use your most recent med list.                   Brand Name Dispense Instructions for use    azithromycin 250 MG tablet    ZITHROMAX    6 tablet    As directed       COCONUT OIL PO          Curcumin Powd      1 tablet       cyanocobalamin 1000 MCG/ML injection    VITAMIN B12    1 mL    Inject 1 mL (1,000 mcg) into the muscle every 30 days       lisinopril 20 MG tablet    PRINIVIL/ZESTRIL    90 tablet    TAKE ONE TABLET BY MOUTH ONCE DAILY       MULTIVITAMIN PO      Take 1 tablet by mouth daily.       PROBIOTIC FORMULA Caps      Take 1 tablet by mouth daily       VITAMIN D (CHOLECALCIFEROL) PO      Take 2,000 Units by mouth daily

## 2017-05-10 NOTE — PROGRESS NOTES
Subjective Finding:    Chief compalint: Patient presents with:  Neck Pain: with headaches  Back Pain  , Pain Scale: 2/10, Intensity: dull and ache, Duration: 10 days, Change since last visit: , Radiating: no.    Date of injury:     Activities that the pain restricts:   Home/household/hobbies/social activities: no.  Work duties: no.  Sleep: no.  Makes symptoms better: rest.  Makes symptoms worse: thoracic extension, thoracic flexion and sitting for long periods.  Have you seen anyone else for the symptoms? No.  Work related: no.  Automobile related injury: no.    Objective and Assessment:    Posture Analysis:   High shoulder: right.  Head tilt: right.  High iliac crest: left.  Head carriage: forward.  Thoracic Kyphosis: neutral.  Lumbar Lordosis: forward.    Lumbar Range of Motion: extension decreased.  Cervical Range of Motion: flexion decreased and extension decreased.  Thoracic Range of Motion: .  Extremity Range of Motion: .    Palpation:   Lumbar pain      Segmental dysfunction pre-treatment:   T5     L4-5  C56    Assessment post-treatment:  Cervical: ROM increased.  Thoracic: ROM increased and pain and tenderness decreased.  Lumbar: ROM increased.    Comments: .      Complicating Factors: .    Plan / Procedure:    Expected release date: .  Treatment plan: PRN.  Instructed patient: ice 20 minutes every other hour as needed and walk 10 minutes.  Short term goals: reduce pain.  Long term goals: restore normal function.  Prognosis: excellent.

## 2017-05-23 ENCOUNTER — OFFICE VISIT (OUTPATIENT)
Dept: CHIROPRACTIC MEDICINE | Facility: OTHER | Age: 67
End: 2017-05-23
Attending: CHIROPRACTOR
Payer: COMMERCIAL

## 2017-05-23 DIAGNOSIS — M99.01 SEGMENTAL AND SOMATIC DYSFUNCTION OF CERVICAL REGION: ICD-10-CM

## 2017-05-23 DIAGNOSIS — M54.50 ACUTE BILATERAL LOW BACK PAIN WITHOUT SCIATICA: ICD-10-CM

## 2017-05-23 DIAGNOSIS — M99.03 SEGMENTAL AND SOMATIC DYSFUNCTION OF LUMBAR REGION: Primary | ICD-10-CM

## 2017-05-23 DIAGNOSIS — M99.02 SEGMENTAL AND SOMATIC DYSFUNCTION OF THORACIC REGION: ICD-10-CM

## 2017-05-23 PROCEDURE — 98941 CHIROPRACT MANJ 3-4 REGIONS: CPT | Performed by: CHIROPRACTOR

## 2017-05-23 NOTE — MR AVS SNAPSHOT
"              After Visit Summary   2017    Wilfrid Liao    MRN: [id number]           Patient Information     Date Of Birth          1950        Visit Information        Provider Department      2017 12:00 PM Jose Marlow DC [facility]         Follow-ups after your visit        Who to contact     If you have questions or need follow up information about today's clinic visit or your schedule please contact  [facility] [place] [facility] directly at [phone].  Normal or non-critical lab and imaging results will be communicated to you by [employer], letter or phone within 4 business days after the clinic has received the results. If you do not hear from us within 7 days, please contact the clinic through [employer] or phone. If you have a critical or abnormal lab result, we will notify you by phone as soon as possible.  Submit refill requests through [employer] or call your pharmacy and they will forward the refill request to us. Please allow 3 business days for your refill to be completed.          Additional Information About Your Visit        [employer] Information     [employer] lets you send messages to your doctor, view your test results, renew your prescriptions, schedule appointments and more. To sign up, go to [email or web address] . Click on \"Log in\" on the left side of the screen, which will take you to the Welcome page. Then click on \"Sign up Now\" on the right side of the page.     You will be asked to enter the access code listed below, as well as some personal information. Please follow the directions to create your username and password.     Your access code is: [email or web address]  Expires: 2017  9:18 AM     Your access code will  in 90 days. If you need help or a new code, please call your [place] clinic or [phone].        Care EveryWhere ID     This is your Care EveryWhere ID. This could be used by other organizations to access your [place] medical records  [id number]   "       Blood Pressure from Last 3 Encounters:   03/16/17 124/72   03/02/17 120/70   01/10/17 120/78    Weight from Last 3 Encounters:   03/16/17 194 lb (88 kg)   03/02/17 190 lb (86.2 kg)   01/10/17 185 lb (83.9 kg)              Today, you had the following     No orders found for display       Primary Care Provider Office Phone # Fax #    Everett Velasco [phone]       [facility] [address]  [place] [address]        Thank you!     Thank you for choosing  [facility] [place] [facility]  for your care. Our goal is always to provide you with excellent care. Hearing back from our patients is one way we can continue to improve our services. Please take a few minutes to complete the written survey that you may receive in the mail after your visit with us. Thank you!             Your Updated Medication List - Protect others around you: Learn how to safely use, store and throw away your medicines at www.disposemymeds.org.          This list is accurate as of: 5/23/17 11:59 PM.  Always use your most recent med list.                   Brand Name Dispense Instructions for use    azithromycin 250 MG tablet    ZITHROMAX    6 tablet    As directed       COCONUT OIL PO          Curcumin Powd      1 tablet       cyanocobalamin 1000 MCG/ML injection    VITAMIN B12    1 mL    Inject 1 mL (1,000 mcg) into the muscle every 30 days       lisinopril 20 MG tablet    PRINIVIL/ZESTRIL    90 tablet    TAKE ONE TABLET BY MOUTH ONCE DAILY       MULTIVITAMIN PO      Take 1 tablet by mouth daily.       PROBIOTIC FORMULA Caps      Take 1 tablet by mouth daily       VITAMIN D (CHOLECALCIFEROL) PO      Take 2,000 Units by mouth daily

## 2017-05-24 NOTE — PROGRESS NOTES
Subjective Finding:    Chief compalint: Patient presents with:  Back Pain  , Pain Scale: 2/10, Intensity: dull and ache, Duration: 10 days, Change since last visit: , Radiating: no.    Date of injury:     Activities that the pain restricts:   Home/household/hobbies/social activities: no.  Work duties: no.  Sleep: no.  Makes symptoms better: rest.  Makes symptoms worse: thoracic extension, thoracic flexion and sitting for long periods.  Have you seen anyone else for the symptoms? No.  Work related: no.  Automobile related injury: no.    Objective and Assessment:    Posture Analysis:   High shoulder: right.  Head tilt: right.  High iliac crest: left.  Head carriage: forward.  Thoracic Kyphosis: neutral.  Lumbar Lordosis: forward.    Lumbar Range of Motion: extension decreased.  Cervical Range of Motion: flexion decreased and extension decreased.  Thoracic Range of Motion: .  Extremity Range of Motion: .    Palpation:   Lumbar pain      Segmental dysfunction pre-treatment:   T5     L4-5  C56    Assessment post-treatment:  Cervical: ROM increased.  Thoracic: ROM increased and pain and tenderness decreased.  Lumbar: ROM increased.    Comments: .      Complicating Factors: .    Plan / Procedure:    Expected release date: .  Treatment plan: PRN.  Instructed patient: ice 20 minutes every other hour as needed and walk 10 minutes.  Short term goals: reduce pain.  Long term goals: restore normal function.  Prognosis: excellent.

## 2017-05-31 ENCOUNTER — ALLIED HEALTH/NURSE VISIT (OUTPATIENT)
Dept: ALLERGY | Facility: OTHER | Age: 67
End: 2017-05-31
Attending: FAMILY MEDICINE
Payer: COMMERCIAL

## 2017-05-31 DIAGNOSIS — K50.90 CROHN'S DISEASE (H): Primary | ICD-10-CM

## 2017-05-31 PROCEDURE — 96372 THER/PROPH/DIAG INJ SC/IM: CPT

## 2017-05-31 NOTE — MR AVS SNAPSHOT
"              After Visit Summary   2017    Wilfrid Liao    MRN: [id number]           Patient Information     Date Of Birth          1950        Visit Information        Provider Department      2017 11:15 AM HC SHOT ROOM [facility]        Today's Diagnoses     Crohn's disease (H)    -  1       Follow-ups after your visit        Who to contact     If you have questions or need follow up information about today's clinic visit or your schedule please [facility] at [phone].  Normal or non-critical lab and imaging results will be communicated to you by MyChart, letter or phone within 4 business days after the clinic has received the results. If you do not hear from us within 7 days, please contact the clinic through [employer] or phone. If you have a critical or abnormal lab result, we will notify you by phone as soon as possible.  Submit refill requests through [employer] or call your pharmacy and they will forward the refill request to us. Please allow 3 business days for your refill to be completed.          Additional Information About Your Visit        MyChart Information     [employer] lets you send messages to your doctor, view your test results, renew your prescriptions, schedule appointments and more. To sign up, go to [email or web address] . Click on \"Log in\" on the left side of the screen, which will take you to the Welcome page. Then click on \"Sign up Now\" on the right side of the page.     You will be asked to enter the access code listed below, as well as some personal information. Please follow the directions to create your username and password.     Your access code is: [email or web address]  Expires: 2017  9:18 AM     Your access code will  in 90 days. If you need help or a new code, please call your [facility] or [phone].        Care EveryWhere ID     This is your Care EveryWhere ID. This could be used by other organizations to " access your [place] medical records  [id number]         Blood Pressure from Last 3 Encounters:   03/16/17 124/72   03/02/17 120/70   01/10/17 120/78    Weight from Last 3 Encounters:   03/16/17 194 lb (88 kg)   03/02/17 190 lb (86.2 kg)   01/10/17 185 lb (83.9 kg)              We Performed the Following     INJECTION INTRAMUSCULAR OR SUB-Q     VITAMIN B12 INJ /1000MCG        Primary Care Provider Office Phone # Fax #    Everett Velasco [phone]       [facility] [address] [place] [address]        Thank you!     Thank you for choosing [facility]  for your care. Our goal is always to provide you with excellent care. Hearing back from our patients is one way we can continue to improve our services. Please take a few minutes to complete the written survey that you may receive in the mail after your visit with us. Thank you!             Your Updated Medication List - Protect others around you: Learn how to safely use, store and throw away your medicines at www.disposemymeds.org.          This list is accurate as of: 5/31/17 11:58 AM.  Always use your most recent med list.                   Brand Name Dispense Instructions for use    azithromycin 250 MG tablet    ZITHROMAX    6 tablet    As directed       COCONUT OIL PO          Curcumin Powd      1 tablet       cyanocobalamin 1000 MCG/ML injection    VITAMIN B12    1 mL    Inject 1 mL (1,000 mcg) into the muscle every 30 days       lisinopril 20 MG tablet    PRINIVIL/ZESTRIL    90 tablet    TAKE ONE TABLET BY MOUTH ONCE DAILY       MULTIVITAMIN PO      Take 1 tablet by mouth daily.       PROBIOTIC FORMULA Caps      Take 1 tablet by mouth daily       VITAMIN D (CHOLECALCIFEROL) PO      Take 2,000 Units by mouth daily

## 2017-05-31 NOTE — NURSING NOTE
MEDICATION: Vitamin B12  1000mcg  ROUTE: IM  SITE: Deltoid - Left  DOSE: 1000mcg  LOT #: [id number]  :  [employer]  EXPIRATION DATE:  07/18  NDC#: 1778-5312-54  IRINA TAVAREZ    Pt received injection 2 days per Francisco J Ashton LPN approval due to pt traveling for work.  IRINA TAVAREZ

## 2017-06-15 ENCOUNTER — TRANSFERRED RECORDS (OUTPATIENT)
Dept: HEALTH INFORMATION MANAGEMENT | Facility: HOSPITAL | Age: 67
End: 2017-06-15

## 2017-06-15 LAB
ALT SERPL-CCNC: 64 U/L (ref 7–55)
AST SERPL-CCNC: 47 U/L (ref 8–48)
CREAT SERPL-MCNC: 0.9 MG/DL (ref 0.8–1.3)
POTASSIUM SERPL-SCNC: 3.7 MMOL/L (ref 3.6–5.2)

## 2017-06-30 ENCOUNTER — ALLIED HEALTH/NURSE VISIT (OUTPATIENT)
Dept: ALLERGY | Facility: OTHER | Age: 67
End: 2017-06-30
Attending: FAMILY MEDICINE
Payer: COMMERCIAL

## 2017-06-30 DIAGNOSIS — K50.019 CROHN'S DISEASE OF SMALL INTESTINE WITH COMPLICATION (H): Primary | ICD-10-CM

## 2017-06-30 PROCEDURE — 96372 THER/PROPH/DIAG INJ SC/IM: CPT

## 2017-06-30 NOTE — PROGRESS NOTES
Prior to injection verified patient identity using patient's name and date of birth.    Per orders of Dr. Velasco, injection of B12 given by Bhumi Ritchie. Patient instructed to remain in clinic for 20 minutes afterwards, and to report any adverse reaction to me immediately.     The following medication was given:     MEDICATION: Vitamin B12  1000 mcg  ROUTE: IM  SITE: Deltoid - Right  DOSE: 1000 mcg  LOT #: [id number]  :  [employer]  EXPIRATION DATE:  1/2018  NDC#: 60668-578-42  Bhumi Ritchie LPN

## 2017-06-30 NOTE — MR AVS SNAPSHOT
"              After Visit Summary   2017    Wilfrid Liao    MRN: [id number]           Patient Information     Date Of Birth          1950        Visit Information        Provider Department      2017 10:45 AM HC SHOT ROOM [facility]        Today's Diagnoses     Crohn's disease of small intestine with complication (H)    -  1       Follow-ups after your visit        Who to contact     If you have questions or need follow up information about today's clinic visit or your schedule please contact [facility] directly at [phone].  Normal or non-critical lab and imaging results will be communicated to you by [facility] [employer] [facility], letter or phone within 4 business days after the clinic has received the results. If you do not hear from us within 7 days, please contact the clinic through [facility] or phone. If you have a critical or abnormal lab result, we will notify you by phone as soon as possible.  Submit refill requests through AdStage or call your pharmacy and they will forward the refill request to us. Please allow 3 business days for your refill to be completed.          Additional Information About Your Visit        MyChart Information     AdStage lets you send messages to your doctor, view your test results, renew your prescriptions, schedule appointments and more. To sign up, go to [email or web address] . Click on \"Log in\" on the left side of the screen, which will take you to the Welcome page. Then click on \"Sign up Now\" on the right side of the page.     You will be asked to enter the access code listed below, as well as some personal information. Please follow the directions to create your username and password.     Your access code is: [email or web address]  Expires: 2017  9:18 AM     Your access code will  in 90 days. If you need help or a new code, please call your [facility] or [phone].        Care EveryWhere ID     This is your Care EveryWhere ID. This could " be used by other organizations to access your [place] medical records  [id number]         Blood Pressure from Last 3 Encounters:   03/16/17 124/72   03/02/17 120/70   01/10/17 120/78    Weight from Last 3 Encounters:   03/16/17 194 lb (88 kg)   03/02/17 190 lb (86.2 kg)   01/10/17 185 lb (83.9 kg)              We Performed the Following     INJECTION INTRAMUSCULAR OR SUB-Q     VITAMIN B12 INJ /1000MCG        Primary Care Provider Office Phone # Fax #    Everett Velasco [phone]       [facility] [address]  [facility] [address]        Equal Access to Services     CAMRYN GONZALES : Hadii osmani dejesus hadasho Sowill, waaxda luqadaha, qaybta kaalmada adeegyada, simba garrison . So [facility] [phone].    ATENCIÓN: Si habla español, tiene a parra disposición servicios gratuitos de asistencia lingüística. Llame al [phone].    We comply with applicable federal civil rights laws and [place] laws. We do not discriminate on the basis of race, color, national origin, age, disability sex, sexual orientation or gender identity.            Thank you!     Thank you for choosing [facility]  for your care. Our goal is always to provide you with excellent care. Hearing back from our patients is one way we can continue to improve our services. Please take a few minutes to complete the written survey that you may receive in the mail after your visit with us. Thank you!             Your Updated Medication List - Protect others around you: Learn how to safely use, store and throw away your medicines at www.disposemymeds.org.          This list is accurate as of: 6/30/17 11:28 AM.  Always use your most recent med list.                   Brand Name Dispense Instructions for use Diagnosis    azithromycin 250 MG tablet    ZITHROMAX    6 tablet    As directed    Acute bronchitis, unspecified organism       COCONUT OIL PO           Curcumin Powd      1 tablet    Crohn's  disease (H), Other and unspecified hyperlipidemia       cyanocobalamin 1000 MCG/ML injection    VITAMIN B12    1 mL    Inject 1 mL (1,000 mcg) into the muscle every 30 days    Crohn's disease of small intestine with complication (H)       lisinopril 20 MG tablet    PRINIVIL/ZESTRIL    90 tablet    TAKE ONE TABLET BY MOUTH ONCE DAILY    HTN (hypertension)       MULTIVITAMIN PO      Take 1 tablet by mouth daily.        PROBIOTIC FORMULA Caps      Take 1 tablet by mouth daily    Crohn's disease (H), Other and unspecified hyperlipidemia       VITAMIN D (CHOLECALCIFEROL) PO      Take 2,000 Units by mouth daily

## 2017-07-18 ENCOUNTER — TELEPHONE (OUTPATIENT)
Dept: FAMILY MEDICINE | Facility: OTHER | Age: 67
End: 2017-07-18

## 2017-07-18 NOTE — TELEPHONE ENCOUNTER
11:15 AM    Reason for Call: Phone Call    Description: Patient is currently doctoring at the [facility] and will be starting to give himself Vitamin B12 injections and does not know how to fill the syringe from a vial and would like to know if the nurse would be able to show him? If you could call back at your convenience [phone]    Was an appointment offered for this call? No    Preferred method for responding to this message: Telephone Call    If we cannot reach you directly, may we leave a detailed response at the number you provided? Yes    Can this message wait until your PCP/provider returns, if available today? YES    Maria Elena Ashton

## 2017-07-20 ENCOUNTER — ALLIED HEALTH/NURSE VISIT (OUTPATIENT)
Dept: FAMILY MEDICINE | Facility: OTHER | Age: 67
End: 2017-07-20
Attending: FAMILY MEDICINE
Payer: COMMERCIAL

## 2017-07-20 DIAGNOSIS — Z71.9 ENCOUNTER FOR COUNSELING: Primary | ICD-10-CM

## 2017-07-20 NOTE — MR AVS SNAPSHOT
"              After Visit Summary   2017    Wilfrid Liao    MRN: [id number]           Patient Information     Date Of Birth          1950        Visit Information        Provider Department      2017 2:15 PM HC FP [facility]URSE [facility]        Today's Diagnoses     Encounter for counseling    -  1       Follow-ups after your visit        Who to contact     If you have questions or need follow up information about today's clinic visit or your schedule please [facility] directly at [phone].  Normal or non-critical lab and imaging results will be communicated to you by [employer], letter or phone within 4 business days after the clinic has received the results. If you do not hear from us within 7 days, please contact the clinic through [facility] [employer] or phone. If you have a critical or abnormal lab result, we will notify you by phone as soon as possible.  Submit refill requests through Parkzzz or call your pharmacy and they will forward the refill request to us. Please allow 3 business days for your refill to be completed.          Additional Information About Your Visit        MyChart Information     Parkzzz lets you send messages to your doctor, view your test results, renew your prescriptions, schedule appointments and more. To sign up, go to [email or web address] . Click on \"Log in\" on the left side of the screen, which will take you to the Welcome page. Then click on \"Sign up Now\" on the right side of the page.     You will be asked to enter the access code listed below, as well as some personal information. Please follow the directions to create your username and password.     Your access code is: [email or web address]  Expires: 2017  9:18 AM     Your access code will  in 90 days. If you need help or a new code, please call your [facility] or [phone].        Care EveryWhere ID     This is your Care EveryWhere ID. This could be used by other organizations to " access your [place] medical records  [id number]         Blood Pressure from Last 3 Encounters:   03/16/17 124/72   03/02/17 120/70   01/10/17 120/78    Weight from Last 3 Encounters:   03/16/17 194 lb (88 kg)   03/02/17 190 lb (86.2 kg)   01/10/17 185 lb (83.9 kg)              Today, you had the following     No orders found for display       Primary Care Provider Office Phone # Fax #    Everett Velasco [phone]       [facility] [address]  [facility] [address]        Equal Access to Services     [facility]: Hadii aad ku hadasho Soomaali, waaxda luqadaha, qaybta kaalmada adeegyada, simba garrison . So [facility] [phone].    ATENCIÓN: Si habla español, tiene a parra disposición servicios gratuitos de asistencia lingüística. Llame al [phone].    We comply with applicable federal civil rights laws and [place] laws. We do not discriminate on the basis of race, color, national origin, age, disability sex, sexual orientation or gender identity.            Thank you!     Thank you for choosing [facility]  for your care. Our goal is always to provide you with excellent care. Hearing back from our patients is one way we can continue to improve our services. Please take a few minutes to complete the written survey that you may receive in the mail after your visit with us. Thank you!             Your Updated Medication List - Protect others around you: Learn how to safely use, store and throw away your medicines at www.disposemymeds.org.          This list is accurate as of: 7/20/17  2:29 PM.  Always use your most recent med list.                   Brand Name Dispense Instructions for use Diagnosis    azithromycin 250 MG tablet    ZITHROMAX    6 tablet    As directed    Acute bronchitis, unspecified organism       COCONUT OIL PO           Curcumin Powd      1 tablet    Crohn's disease (H), Other and unspecified hyperlipidemia        cyanocobalamin 1000 MCG/ML injection    VITAMIN B12    1 mL    Inject 1 mL (1,000 mcg) into the muscle every 30 days    Crohn's disease of small intestine with complication (H)       lisinopril 20 MG tablet    PRINIVIL/ZESTRIL    90 tablet    TAKE ONE TABLET BY MOUTH ONCE DAILY    HTN (hypertension)       MULTIVITAMIN PO      Take 1 tablet by mouth daily.        PROBIOTIC FORMULA Caps      Take 1 tablet by mouth daily    Crohn's disease (H), Other and unspecified hyperlipidemia       VITAMIN D (CHOLECALCIFEROL) PO      Take 2,000 Units by mouth daily

## 2017-07-20 NOTE — NURSING NOTE
Pt comes in today to learn how to administer B12 injection.  Pt verbalizes understanding.  Destiny Martinez

## 2017-08-03 ENCOUNTER — OFFICE VISIT (OUTPATIENT)
Dept: CHIROPRACTIC MEDICINE | Facility: OTHER | Age: 67
End: 2017-08-03
Attending: CHIROPRACTOR
Payer: COMMERCIAL

## 2017-08-03 DIAGNOSIS — M99.02 SEGMENTAL AND SOMATIC DYSFUNCTION OF THORACIC REGION: ICD-10-CM

## 2017-08-03 DIAGNOSIS — M99.03 SEGMENTAL AND SOMATIC DYSFUNCTION OF LUMBAR REGION: Primary | ICD-10-CM

## 2017-08-03 DIAGNOSIS — M99.01 SEGMENTAL AND SOMATIC DYSFUNCTION OF CERVICAL REGION: ICD-10-CM

## 2017-08-03 DIAGNOSIS — M54.50 ACUTE LEFT-SIDED LOW BACK PAIN WITHOUT SCIATICA: ICD-10-CM

## 2017-08-03 PROCEDURE — 98941 CHIROPRACT MANJ 3-4 REGIONS: CPT | Performed by: CHIROPRACTOR

## 2017-08-03 NOTE — MR AVS SNAPSHOT
"              After Visit Summary   8/3/2017    Wilfrid Liao    MRN: [id number]           Patient Information     Date Of Birth          1950        Visit Information        Provider Department      8/3/2017 11:40 AM Jose Marlow DC  [facility]        Today's Diagnoses     Segmental and somatic dysfunction of lumbar region    -  1    Acute left-sided low back pain without sciatica        Segmental and somatic dysfunction of thoracic region        Segmental and somatic dysfunction of cervical region           Follow-ups after your visit        Who to contact     If you have questions or need follow up information about today's clinic visit or your schedule please contact  [facility] directly at [phone].  Normal or non-critical lab and imaging results will be communicated to you by [employer]hart, letter or phone within 4 business days after the clinic has received the results. If you do not hear from us within 7 days, please contact the clinic through [employer] or phone. If you have a critical or abnormal lab result, we will notify you by phone as soon as possible.  Submit refill requests through ASSIA or call your pharmacy and they will forward the refill request to us. Please allow 3 business days for your refill to be completed.          Additional Information About Your Visit        MyChart Information     ASSIA lets you send messages to your doctor, view your test results, renew your prescriptions, schedule appointments and more. To sign up, go to [email or web address] . Click on \"Log in\" on the left side of the screen, which will take you to the Welcome page. Then click on \"Sign up Now\" on the right side of the page.     You will be asked to enter the access code listed below, as well as some personal information. Please follow the directions to create your username and password.     Your access code is: [email or web address]  Expires: 11/5/2017  2:10 PM     Your access code will "  in 90 days. If you need help or a new code, please call your [place] clinic or [phone].        Care EveryWhere ID     This is your Care EveryWhere ID. This could be used by other organizations to access your [place] medical records  [id number]         Blood Pressure from Last 3 Encounters:   17 124/72   17 120/70   01/10/17 120/78    Weight from Last 3 Encounters:   17 194 lb (88 kg)   17 190 lb (86.2 kg)   01/10/17 185 lb (83.9 kg)              We Performed the Following     CHIROPRAC MANIP,SPINAL,3-4 REGIONS        Primary Care Provider Office Phone # Fax #    Everett Velasco [phone]       [facility] [address]  [facility] [address]        Equal Access to Services     [facility]: Hadii aad ku hadasho Soomaali, waaxda luqadaha, qaybta kaalmada adeegyada, waxay chunin haykelle agrrison . So [facility] [phone].    ATENCIÓN: Si habla español, tiene a parra disposición servicios gratuitos de asistencia lingüística. Llame al [phone].    We comply with applicable federal civil rights laws and [place] laws. We do not discriminate on the basis of race, color, national origin, age, disability sex, sexual orientation or gender identity.            Thank you!     Thank you for choosing  [facility] [place]  for your care. Our goal is always to provide you with excellent care. Hearing back from our patients is one way we can continue to improve our services. Please take a few minutes to complete the written survey that you may receive in the mail after your visit with us. Thank you!             Your Updated Medication List - Protect others around you: Learn how to safely use, store and throw away your medicines at www.disposemymeds.org.          This list is accurate as of: 8/3/17 11:59 PM.  Always use your most recent med list.                   Brand Name Dispense Instructions for use Diagnosis    azithromycin 250 MG tablet     ZITHROMAX    6 tablet    As directed    Acute bronchitis, unspecified organism       COCONUT OIL PO           Curcumin Powd      1 tablet    Crohn's disease (H), Other and unspecified hyperlipidemia       cyanocobalamin 1000 MCG/ML injection    VITAMIN B12    1 mL    Inject 1 mL (1,000 mcg) into the muscle every 30 days    Crohn's disease of small intestine with complication (H)       lisinopril 20 MG tablet    PRINIVIL/ZESTRIL    90 tablet    TAKE ONE TABLET BY MOUTH ONCE DAILY    HTN (hypertension)       MULTIVITAMIN PO      Take 1 tablet by mouth daily.        PROBIOTIC FORMULA Caps      Take 1 tablet by mouth daily    Crohn's disease (H), Other and unspecified hyperlipidemia       VITAMIN D (CHOLECALCIFEROL) PO      Take 2,000 Units by mouth daily

## 2017-08-04 ENCOUNTER — OFFICE VISIT (OUTPATIENT)
Dept: CHIROPRACTIC MEDICINE | Facility: OTHER | Age: 67
End: 2017-08-04
Attending: CHIROPRACTOR
Payer: COMMERCIAL

## 2017-08-04 DIAGNOSIS — M99.01 SEGMENTAL AND SOMATIC DYSFUNCTION OF CERVICAL REGION: ICD-10-CM

## 2017-08-04 DIAGNOSIS — M99.02 SEGMENTAL AND SOMATIC DYSFUNCTION OF THORACIC REGION: ICD-10-CM

## 2017-08-04 DIAGNOSIS — M54.50 ACUTE LEFT-SIDED LOW BACK PAIN WITHOUT SCIATICA: ICD-10-CM

## 2017-08-04 DIAGNOSIS — M99.03 SEGMENTAL AND SOMATIC DYSFUNCTION OF LUMBAR REGION: Primary | ICD-10-CM

## 2017-08-04 PROCEDURE — 98941 CHIROPRACT MANJ 3-4 REGIONS: CPT | Performed by: CHIROPRACTOR

## 2017-08-04 NOTE — MR AVS SNAPSHOT
"              After Visit Summary   8/4/2017    Wilfrid Liao    MRN: [id number]           Patient Information     Date Of Birth          1950        Visit Information        Provider Department      8/4/2017 12:00 PM Jose Marlow DC  [facility]        Today's Diagnoses     Segmental and somatic dysfunction of lumbar region    -  1    Acute left-sided low back pain without sciatica        Segmental and somatic dysfunction of thoracic region        Segmental and somatic dysfunction of cervical region           Follow-ups after your visit        Who to contact     If you have questions or need follow up information about today's clinic visit or your schedule please contact  [facility] directly at [phone].  Normal or non-critical lab and imaging results will be communicated to you by [facility] [employer], letter or phone within 4 business days after the clinic has received the results. If you do not hear from us within 7 days, please contact the clinic through [facility] [employer] or phone. If you have a critical or abnormal lab result, we will notify you by phone as soon as possible.  Submit refill requests through GazeHawk or call your pharmacy and they will forward the refill request to us. Please allow 3 business days for your refill to be completed.          Additional Information About Your Visit        MyChart Information     GazeHawk lets you send messages to your doctor, view your test results, renew your prescriptions, schedule appointments and more. To sign up, go to [email or web address] . Click on \"Log in\" on the left side of the screen, which will take you to the Welcome page. Then click on \"Sign up Now\" on the right side of the page.     You will be asked to enter the access code listed below, as well as some personal information. Please follow the directions to create your username and password.     Your access code is: [email or web address]  Expires: 11/5/2017  2:10 PM     Your access code will "  in 90 days. If you need help or a new code, please call your [place] clinic or [phone].        Care EveryWhere ID     This is your Care EveryWhere ID. This could be used by other organizations to access your [place] medical records  [id number]         Blood Pressure from Last 3 Encounters:   17 124/72   17 120/70   01/10/17 120/78    Weight from Last 3 Encounters:   17 194 lb (88 kg)   17 190 lb (86.2 kg)   01/10/17 185 lb (83.9 kg)              We Performed the Following     CHIROPRAC MANIP,SPINAL,3-4 REGIONS        Primary Care Provider Office Phone # Fax #    Everett Velasco [phone]       [facility] [address]  [facility] [address]        Equal Access to Services     [facility]: Hadii aad ku hadasho Soomaali, waaxda luqadaha, qaybta kaalmada adeegyada, waxay chunin haykelle garrison . So [facility] [phone].    ATENCIÓN: Si habla español, tiene a parra disposición servicios gratuitos de asistencia lingüística. Llame al [phone].    We comply with applicable federal civil rights laws and [place] laws. We do not discriminate on the basis of race, color, national origin, age, disability sex, sexual orientation or gender identity.            Thank you!     Thank you for choosing  [facility] [place]  for your care. Our goal is always to provide you with excellent care. Hearing back from our patients is one way we can continue to improve our services. Please take a few minutes to complete the written survey that you may receive in the mail after your visit with us. Thank you!             Your Updated Medication List - Protect others around you: Learn how to safely use, store and throw away your medicines at www.disposemymeds.org.          This list is accurate as of: 17 11:59 PM.  Always use your most recent med list.                   Brand Name Dispense Instructions for use Diagnosis    azithromycin 250 MG tablet     ZITHROMAX    6 tablet    As directed    Acute bronchitis, unspecified organism       COCONUT OIL PO           Curcumin Powd      1 tablet    Crohn's disease (H), Other and unspecified hyperlipidemia       cyanocobalamin 1000 MCG/ML injection    VITAMIN B12    1 mL    Inject 1 mL (1,000 mcg) into the muscle every 30 days    Crohn's disease of small intestine with complication (H)       lisinopril 20 MG tablet    PRINIVIL/ZESTRIL    90 tablet    TAKE ONE TABLET BY MOUTH ONCE DAILY    HTN (hypertension)       MULTIVITAMIN PO      Take 1 tablet by mouth daily.        PROBIOTIC FORMULA Caps      Take 1 tablet by mouth daily    Crohn's disease (H), Other and unspecified hyperlipidemia       VITAMIN D (CHOLECALCIFEROL) PO      Take 2,000 Units by mouth daily

## 2017-08-07 NOTE — PROGRESS NOTES
Subjective Finding:    Chief compalint: No chief complaint on file.  , Pain Scale: 2/10, Intensity: dull and ache, Duration: 10 days, Change since last visit: , Radiating: no.    Date of injury:     Activities that the pain restricts:   Home/household/hobbies/social activities: no.  Work duties: no.  Sleep: no.  Makes symptoms better: rest.  Makes symptoms worse: thoracic extension, thoracic flexion and sitting for long periods.  Have you seen anyone else for the symptoms? No.  Work related: no.  Automobile related injury: no.    Objective and Assessment:    Posture Analysis:   High shoulder: right.  Head tilt: right.  High iliac crest: left.  Head carriage: forward.  Thoracic Kyphosis: neutral.  Lumbar Lordosis: forward.    Lumbar Range of Motion: extension decreased.  Cervical Range of Motion: flexion decreased and extension decreased.  Thoracic Range of Motion: .  Extremity Range of Motion: .    Palpation:   Lumbar pain      Segmental dysfunction pre-treatment:   T5     L4-5  C56    Assessment post-treatment:  Cervical: ROM increased.  Thoracic: ROM increased and pain and tenderness decreased.  Lumbar: ROM increased.    Comments: .      Complicating Factors: .    Plan / Procedure:    Expected release date: .  Treatment plan: PRN.  Instructed patient: ice 20 minutes every other hour as needed and walk 10 minutes.  Short term goals: reduce pain.  Long term goals: restore normal function.  Prognosis: excellent.

## 2017-08-10 ENCOUNTER — TELEPHONE (OUTPATIENT)
Dept: FAMILY MEDICINE | Facility: OTHER | Age: 67
End: 2017-08-10

## 2017-08-10 DIAGNOSIS — I10 ESSENTIAL HYPERTENSION: Primary | ICD-10-CM

## 2017-08-13 RX ORDER — LISINOPRIL 10 MG/1
10 TABLET ORAL DAILY
Qty: 90 TABLET | Refills: 3 | Status: SHIPPED | OUTPATIENT
Start: 2017-08-13 | End: 2018-08-13

## 2017-08-15 DIAGNOSIS — I15.9 SECONDARY HYPERTENSION: ICD-10-CM

## 2017-08-15 RX ORDER — LISINOPRIL 20 MG/1
20 TABLET ORAL DAILY
Qty: 90 TABLET | Refills: 1 | Status: SHIPPED | OUTPATIENT
Start: 2017-08-15 | End: 2017-12-04

## 2017-08-15 NOTE — TELEPHONE ENCOUNTER
Lisinopril 20mg       Last Written Prescription Date: 8/23/2016  Last Fill Quantity: 90, # refills: 1  Last Office Visit with G, [email or web address] or [facility] prescribing provider: 8/4/2017       Potassium   Date Value Ref Range Status   06/15/2017 3.7 3.6 - 5.2 mmol/L Final     Creatinine   Date Value Ref Range Status   06/15/2017 0.9 0.8 - 1.3 mg/dL Final     BP Readings from Last 3 Encounters:   03/16/17 124/72   03/02/17 120/70   01/10/17 120/78

## 2017-09-06 ENCOUNTER — OFFICE VISIT (OUTPATIENT)
Dept: CHIROPRACTIC MEDICINE | Facility: OTHER | Age: 67
End: 2017-09-06
Attending: CHIROPRACTOR
Payer: COMMERCIAL

## 2017-09-06 DIAGNOSIS — M99.02 SEGMENTAL AND SOMATIC DYSFUNCTION OF THORACIC REGION: ICD-10-CM

## 2017-09-06 DIAGNOSIS — M99.03 SEGMENTAL AND SOMATIC DYSFUNCTION OF LUMBAR REGION: Primary | ICD-10-CM

## 2017-09-06 DIAGNOSIS — M99.01 SEGMENTAL AND SOMATIC DYSFUNCTION OF CERVICAL REGION: ICD-10-CM

## 2017-09-06 DIAGNOSIS — M54.50 ACUTE BILATERAL LOW BACK PAIN WITHOUT SCIATICA: ICD-10-CM

## 2017-09-06 PROCEDURE — 98941 CHIROPRACT MANJ 3-4 REGIONS: CPT | Performed by: CHIROPRACTOR

## 2017-09-06 NOTE — MR AVS SNAPSHOT
"              After Visit Summary   9/6/2017    Wilfrid Liao    MRN: [id number]           Patient Information     Date Of Birth          1950        Visit Information        Provider Department      9/6/2017 2:10 PM Jose Marlow DC  [facility]        Today's Diagnoses     Segmental and somatic dysfunction of lumbar region    -  1    Acute bilateral low back pain without sciatica        Segmental and somatic dysfunction of thoracic region        Segmental and somatic dysfunction of cervical region           Follow-ups after your visit        Who to contact     If you have questions or need follow up information about today's clinic visit or your schedule please contact  [facility] directly at [phone].  Normal or non-critical lab and imaging results will be communicated to you by [employer], letter or phone within 4 business days after the clinic has received the results. If you do not hear from us within 7 days, please contact the clinic through MyChart or phone. If you have a critical or abnormal lab result, we will notify you by phone as soon as possible.  Submit refill requests through [employer] or call your pharmacy and they will forward the refill request to us. Please allow 3 business days for your refill to be completed.          Additional Information About Your Visit        MyChart Information     [employer] lets you send messages to your doctor, view your test results, renew your prescriptions, schedule appointments and more. To sign up, go to [email or web address] . Click on \"Log in\" on the left side of the screen, which will take you to the Welcome page. Then click on \"Sign up Now\" on the right side of the page.     You will be asked to enter the access code listed below, as well as some personal information. Please follow the directions to create your username and password.     Your access code is: [email or web address]  Expires: 11/5/2017  2:10 PM     Your access code will "  in 90 days. If you need help or a new code, please call your [facility] clinic or [phone].        Care EveryWhere ID     This is your Care EveryWhere ID. This could be used by other organizations to access your [facility] medical records  [id number]         Blood Pressure from Last 3 Encounters:   17 124/72   17 120/70   01/10/17 120/78    Weight from Last 3 Encounters:   17 194 lb (88 kg)   17 190 lb (86.2 kg)   01/10/17 185 lb (83.9 kg)              We Performed the Following     CHIROPRAC MANIP,SPINAL,3-4 REGIONS        Primary Care Provider Office Phone # Fax #    Everett Velasco [phone]       [facility] [address]  [facility] [address]        Equal Access to Services     [facility]: Hadii aad ku hadasho Soomaali, waaxda luqadaha, qaybta kaalmada adeegyada, waxay chunin haykelle garrison . So [facility] [phone].    ATENCIÓN: Si habla español, tiene a parra disposición servicios gratuitos de asistencia lingüística. Llame al [phone].    We comply with applicable federal civil rights laws and [place] laws. We do not discriminate on the basis of race, color, national origin, age, disability sex, sexual orientation or gender identity.            Thank you!     Thank you for choosing  [facility]  for your care. Our goal is always to provide you with excellent care. Hearing back from our patients is one way we can continue to improve our services. Please take a few minutes to complete the written survey that you may receive in the mail after your visit with us. Thank you!             Your Updated Medication List - Protect others around you: Learn how to safely use, store and throw away your medicines at www.disposemymeds.org.          This list is accurate as of: 17 11:59 PM.  Always use your most recent med list.                   Brand Name Dispense Instructions for use Diagnosis    azithromycin 250 MG tablet     ZITHROMAX    6 tablet    As directed    Acute bronchitis, unspecified organism       COCONUT OIL PO           Curcumin Powd      1 tablet    Crohn's disease (H), Other and unspecified hyperlipidemia       cyanocobalamin 1000 MCG/ML injection    VITAMIN B12    1 mL    Inject 1 mL (1,000 mcg) into the muscle every 30 days    Crohn's disease of small intestine with complication (H)       * lisinopril 10 MG tablet    PRINIVIL/ZESTRIL    90 tablet    Take 1 tablet (10 mg) by mouth daily    Essential hypertension       * lisinopril 20 MG tablet    PRINIVIL/ZESTRIL    90 tablet    Take 1 tablet (20 mg) by mouth daily    Secondary hypertension       MULTIVITAMIN PO      Take 1 tablet by mouth daily.        PROBIOTIC FORMULA Caps      Take 1 tablet by mouth daily    Crohn's disease (H), Other and unspecified hyperlipidemia       VITAMIN D (CHOLECALCIFEROL) PO      Take 2,000 Units by mouth daily        * Notice:  This list has 2 medication(s) that are the same as other medications prescribed for you. Read the directions carefully, and ask your doctor or other care provider to review them with you.

## 2017-12-01 ENCOUNTER — TRANSFERRED RECORDS (OUTPATIENT)
Dept: HEALTH INFORMATION MANAGEMENT | Facility: HOSPITAL | Age: 67
End: 2017-12-01

## 2017-12-04 ENCOUNTER — TRANSFERRED RECORDS (OUTPATIENT)
Dept: HEALTH INFORMATION MANAGEMENT | Facility: HOSPITAL | Age: 67
End: 2017-12-04

## 2017-12-04 DIAGNOSIS — I15.9 SECONDARY HYPERTENSION: ICD-10-CM

## 2017-12-04 NOTE — TELEPHONE ENCOUNTER
Lisinopril       Last Written Prescription Date: 8/15/17  Last Fill Quantity: 90,  # refills: 1   Last Office Visit with G, [facility] or [facility] prescribing provider: 3/16/17

## 2017-12-07 RX ORDER — LISINOPRIL 20 MG/1
20 TABLET ORAL DAILY
Qty: 90 TABLET | Refills: 0 | Status: SHIPPED | OUTPATIENT
Start: 2017-12-07

## 2017-12-08 ENCOUNTER — TELEPHONE (OUTPATIENT)
Dept: FAMILY MEDICINE | Facility: OTHER | Age: 67
End: 2017-12-08

## 2017-12-08 NOTE — TELEPHONE ENCOUNTER
Spoke with patient about his lisinopril 10 mg. He moved to [place] and got his rx filled for 3 months. He got a call from [facility] in [place] stating his rx is ready for the 20 mg but he doesn't take the 20 mg anymore. I explained to him that if he didn't  the rx in [place] it would just go back on the shelf. Do you need to cancel the rx in [place]? Just more informational.

## 2017-12-08 NOTE — TELEPHONE ENCOUNTER
9:37 AM    Reason for Call: Phone Call    Description: Patient would like to speak to the nurse regarding dosage issues with his BP medication. If you could call back at your earliest convenience.    Was an appointment offered for this call? No  If yes : Appointment type              Date    Preferred method for responding to this message: Telephone Call  What is your phone number ? [phone]    If we cannot reach you directly, may we leave a detailed response at the number you provided? Yes    Can this message wait until your PCP/provider returns, if available today? YES    Maria Elena Ashton

## 2018-08-13 DIAGNOSIS — I10 ESSENTIAL HYPERTENSION: ICD-10-CM

## 2018-08-14 RX ORDER — LISINOPRIL 10 MG/1
TABLET ORAL
Qty: 90 TABLET | Refills: 0 | Status: SHIPPED | OUTPATIENT
Start: 2018-08-14

## 2021-10-01 NOTE — TELEPHONE ENCOUNTER
No answer.  
Patient has been taking lisinopril 1/2 tablet daily for years, would like RX for 10 mg tablet instead of cutting in half. Blood pressure has been stable at that dose last few visits. Please advise dose change.  
Patient notified via phone.    
Please call patient to clarify.  8/23/16 #90, 1R Lisinopril was last signed for 20 mg 1 time daily.  I do not see any notes changing the dosage.  Thank you.  
Reason for call:  Medication    1. Medication Name? Lisinopril 10 mg  2. Is this request for a refill? Yes  3. What Pharmacy do you use? [facility]  4. Have you contacted your pharmacy? Yes    5. If yes, when? Today  (Please note that the turn-around-time for prescriptions is 72 business hours; I am sending your request at this time. SEND TO  [facility]  )  Description: Patient went to  Rx, but it states for 20 mg and patient's Rx was changed to 10 mg. Can the new Rx be resent.  Was an appointment offered for this a call? No   Preferred method for responding to this messageTelephone Call  If we cannot reach you directly, may we leave a detailed response at the number you provided? Yes  Can this message wait until your PCP/Provider returns if not available today? No    
Stay on same dose he WAS on .  10mg daily   
IMPROVE-DD Application Not Available

## 2023-03-27 ENCOUNTER — MEDICAL CORRESPONDENCE (OUTPATIENT)
Dept: MRI IMAGING | Facility: HOSPITAL | Age: 73
End: 2023-03-27

## 2023-04-14 ENCOUNTER — HOSPITAL ENCOUNTER (OUTPATIENT)
Dept: MRI IMAGING | Facility: HOSPITAL | Age: 73
Discharge: HOME | End: 2023-04-14
Attending: FAMILY MEDICINE | Admitting: FAMILY MEDICINE
Payer: MEDICARE

## 2023-04-14 DIAGNOSIS — M54.12 CERVICAL RADICULOPATHY: ICD-10-CM

## 2023-04-14 DIAGNOSIS — M54.2 NECK PAIN: ICD-10-CM

## 2023-04-14 PROCEDURE — 72141 MRI NECK SPINE W/O DYE: CPT

## 2023-04-20 ENCOUNTER — MEDICAL CORRESPONDENCE (OUTPATIENT)
Dept: INTERVENTIONAL RADIOLOGY/VASCULAR | Facility: HOSPITAL | Age: 73
End: 2023-04-20

## 2023-04-24 ENCOUNTER — MEDICAL CORRESPONDENCE (OUTPATIENT)
Dept: HEALTH INFORMATION MANAGEMENT | Facility: HOSPITAL | Age: 73
End: 2023-04-24

## 2023-05-01 ENCOUNTER — MEDICAL CORRESPONDENCE (OUTPATIENT)
Dept: INTERVENTIONAL RADIOLOGY/VASCULAR | Facility: HOSPITAL | Age: 73
End: 2023-05-01

## 2023-05-05 ENCOUNTER — HOSPITAL ENCOUNTER (OUTPATIENT)
Dept: INTERVENTIONAL RADIOLOGY/VASCULAR | Facility: HOSPITAL | Age: 73
Discharge: HOME | End: 2023-05-05
Attending: FAMILY MEDICINE | Admitting: RADIOLOGY
Payer: MEDICARE

## 2023-05-05 ENCOUNTER — HOSPITAL ENCOUNTER (OUTPATIENT)
Facility: HOSPITAL | Age: 73
Discharge: HOME | End: 2023-05-05
Attending: RADIOLOGY | Admitting: RADIOLOGY
Payer: MEDICARE

## 2023-05-05 DIAGNOSIS — M47.23 OTHER SPONDYLOSIS WITH RADICULOPATHY, CERVICOTHORACIC REGION: ICD-10-CM

## 2023-05-05 DIAGNOSIS — T14.8XXA NERVE COMPRESSION: ICD-10-CM

## 2023-05-05 DIAGNOSIS — M47.22 CERVICAL SPONDYLOSIS WITH RADICULOPATHY: ICD-10-CM

## 2023-05-05 DIAGNOSIS — M50.30 BULGING OF CERVICAL INTERVERTEBRAL DISC: ICD-10-CM

## 2023-05-05 DIAGNOSIS — R52 PAIN: ICD-10-CM

## 2023-05-05 PROCEDURE — 250N000011 HC RX IP 250 OP 636: Performed by: RADIOLOGY

## 2023-05-05 PROCEDURE — 62321 NJX INTERLAMINAR CRV/THRC: CPT

## 2023-05-05 RX ORDER — DEXAMETHASONE SODIUM PHOSPHATE 10 MG/ML
10 INJECTION, SOLUTION INTRAMUSCULAR; INTRAVENOUS ONCE
Status: COMPLETED | OUTPATIENT
Start: 2023-05-05 | End: 2023-05-05

## 2023-05-05 RX ORDER — IOPAMIDOL 612 MG/ML
15 INJECTION, SOLUTION INTRATHECAL ONCE
Status: COMPLETED | OUTPATIENT
Start: 2023-05-05 | End: 2023-05-05

## 2023-05-05 RX ADMIN — IOPAMIDOL 3 ML: 612 INJECTION, SOLUTION INTRATHECAL at 09:50

## 2023-05-05 RX ADMIN — DEXAMETHASONE SODIUM PHOSPHATE 10 MG: 10 INJECTION INTRAMUSCULAR; INTRAVENOUS at 09:50

## 2023-05-05 ASSESSMENT — ACTIVITIES OF DAILY LIVING (ADL): ADLS_ACUITY_SCORE: 35

## 2023-05-05 NOTE — DISCHARGE INSTRUCTIONS
Cell number on file:    Telephone Information:   Mobile [phone]     Is it ok to leave a message at this number(s)? Yes    INDIA completed your procedure on 5/5/2023.    Current Pain Level (0-10 Scale): 5/10  Post Pain Level (0-10):  0/10    Radiology Discharge instructions for Steroid Injection    Activity Level:     Do not do any heavy activity or exercise for 24 hours.   Do not drive for 4 hours after your injection.  Diet:   Return to your normal diet.  Medications:   If you have stopped taking your Aspirin, Coumadin/Warfarin, Ibuprofen, or any   other blood thinner for this procedure you may resume in the morning unless   your primary care provider has given you other instructions.    Diabetics may see an increase in blood sugar after steroid injections. If you are concerned about your blood sugar, please contact your family doctor.    Site Care:  Remove the bandage and bathe or shower the morning after the procedure.      Please allow two weeks to experience improvement in your pain.  If you have any further issues, please contact your provider.    Call your Primary Care Provider if you have the following (if your primary care provider is not available please seek emergency care):   Nausea with vomiting   Severe headache   Drowsiness or confusion   Redness or drainage at the injection or puncture site   Temperature over 101 degrees F   Other concerns   Worsening back pain   Stiff neck

## 2023-05-18 ENCOUNTER — TELEPHONE (OUTPATIENT)
Dept: INTERVENTIONAL RADIOLOGY/VASCULAR | Facility: HOSPITAL | Age: 73
End: 2023-05-18

## 2023-05-18 NOTE — TELEPHONE ENCOUNTER
INJECTION POST CALL    Procedure: Epidural TL C7-T1  Radiologist(s): Dr. OSBORNE  Date of Procedure:  5/5/23    Relief of pain from this injection    A = 90%  A- = 85%  B = 80%  B- =75%  C = 70%  C- = 65%  D = 60%  D- = 50%  F = less than 50%    Do you feel this injection was beneficial? Somewhat  If yes, how long did it last? Patient states he is receiving about 45-50% of relief.    Instruct patient to follow up with their provider for any further care they may need. (as Dr. Lopez will no longer be making recommendations nor addended the exam with recommmendations)    Chelo Lr

## 2023-05-18 NOTE — TELEPHONE ENCOUNTER
INJECTION POST CALL    Procedure: Epidural TL C7-T1  Radiologist(s): Dr. Frank Huff  Date of Procedure:  5/5/23      Left a message for patient to call IR department back.    Chelo Lr

## 2023-05-19 ENCOUNTER — MEDICAL CORRESPONDENCE (OUTPATIENT)
Dept: INTERVENTIONAL RADIOLOGY/VASCULAR | Facility: HOSPITAL | Age: 73
End: 2023-05-19

## 2023-05-31 ENCOUNTER — TELEPHONE (OUTPATIENT)
Dept: INTERVENTIONAL RADIOLOGY/VASCULAR | Facility: HOSPITAL | Age: 73
End: 2023-05-31

## 2023-06-16 ENCOUNTER — HOSPITAL ENCOUNTER (OUTPATIENT)
Facility: HOSPITAL | Age: 73
Discharge: HOME | End: 2023-06-16
Attending: RADIOLOGY | Admitting: RADIOLOGY
Payer: MEDICARE

## 2023-06-16 ENCOUNTER — APPOINTMENT (OUTPATIENT)
Dept: GENERAL RADIOLOGY | Facility: HOSPITAL | Age: 73
End: 2023-06-16
Attending: FAMILY MEDICINE
Payer: MEDICARE

## 2023-06-16 DIAGNOSIS — M50.30 DEGENERATIVE DISC DISEASE, CERVICAL: ICD-10-CM

## 2023-06-16 DIAGNOSIS — M50.30 BULGING OF CERVICAL INTERVERTEBRAL DISC: ICD-10-CM

## 2023-06-16 DIAGNOSIS — T14.8XXA NERVE COMPRESSION: ICD-10-CM

## 2023-06-16 DIAGNOSIS — M47.22 CERVICAL SPONDYLOSIS WITH RADICULOPATHY: ICD-10-CM

## 2023-06-16 PROCEDURE — 272N000472 XR CERVICAL/THORACIC EPIDURAL INJ INCL IMAGING

## 2023-06-16 PROCEDURE — 250N000011 HC RX IP 250 OP 636: Performed by: RADIOLOGY

## 2023-06-16 RX ORDER — DEXAMETHASONE SODIUM PHOSPHATE 10 MG/ML
10 INJECTION, SOLUTION INTRAMUSCULAR; INTRAVENOUS ONCE
Status: COMPLETED | OUTPATIENT
Start: 2023-06-16 | End: 2023-06-16

## 2023-06-16 RX ORDER — IOPAMIDOL 612 MG/ML
15 INJECTION, SOLUTION INTRATHECAL ONCE
Status: COMPLETED | OUTPATIENT
Start: 2023-06-16 | End: 2023-06-16

## 2023-06-16 RX ADMIN — IOPAMIDOL 5 ML: 612 INJECTION, SOLUTION INTRATHECAL at 09:28

## 2023-06-16 RX ADMIN — DEXAMETHASONE SODIUM PHOSPHATE 10 MG: 10 INJECTION, SOLUTION INTRAMUSCULAR; INTRAVENOUS at 09:27

## 2023-06-16 ASSESSMENT — ACTIVITIES OF DAILY LIVING (ADL)
ADLS_ACUITY_SCORE: 35
ADLS_ACUITY_SCORE: 35

## 2023-06-16 NOTE — DISCHARGE INSTRUCTIONS
Cell number on file:    Telephone Information:   Mobile [phone]     Is it ok to leave a message at this number(s)? Yes    Dr Hastings completed your procedure on 6/16/2023.    Current Pain Level (0-10 Scale): 6/10  Post Pain Level (0-10):  5/10    Radiology Discharge instructions for Steroid Injection    Activity Level:     Do not do any heavy activity or exercise for 24 hours.   Do not drive for 4 hours after your injection.  Diet:   Return to your normal diet.  Medications:   If you have stopped taking your Aspirin, Coumadin/Warfarin, Ibuprofen, or any   other blood thinner for this procedure you may resume in the morning unless   your primary care provider has given you other instructions.    Diabetics may see an increase in blood sugar after steroid injections. If you are concerned about your blood sugar, please contact your family doctor.    Site Care:  Remove the bandage and bathe or shower the morning after the procedure.      Please allow two weeks to experience improvement in your pain.  If you have any further issues, please contact your provider.    Call your Primary Care Provider if you have the following (if your primary care provider is not available please seek emergency care):   Nausea with vomiting   Severe headache   Drowsiness or confusion   Redness or drainage at the injection or puncture site   Temperature over 101 degrees F   Other concerns   Worsening back pain   Stiff neck

## 2023-06-30 ENCOUNTER — TELEPHONE (OUTPATIENT)
Dept: INTERVENTIONAL RADIOLOGY/VASCULAR | Facility: HOSPITAL | Age: 73
End: 2023-06-30

## 2023-06-30 NOTE — TELEPHONE ENCOUNTER
INJECTION POST CALL    Procedure: Epidural TL C7-T1  Radiologist(s): Dr. Trenton Hastings  Date of Procedure:  06/16/23    Relief of pain from this injection    A = 90%  A- = 85%  B = 80%  B- =75%  C = 70%  C- = 65%  D = 60%  D- = 50%  F = less than 50%    Percentage of relief   50%    Do you feel this injection was beneficial?Yes  If yes, how long did it last? 1 week     Instruct patient to follow up with their provider for any further care they may need. (as Dr. Lopez will no longer be making recommendations nor addended the exam with recommmendations)    Bridgette Melendez

## 2023-09-06 ENCOUNTER — HOSPITAL ENCOUNTER (EMERGENCY)
Facility: OTHER | Age: 73
Discharge: HOME | End: 2023-09-06
Attending: FAMILY MEDICINE | Admitting: FAMILY MEDICINE
Payer: MEDICARE

## 2023-09-06 VITALS
OXYGEN SATURATION: 97 % | DIASTOLIC BLOOD PRESSURE: 107 MMHG | HEART RATE: 65 BPM | BODY MASS INDEX: 28.7 KG/M2 | WEIGHT: 200 LBS | TEMPERATURE: 97.8 F | RESPIRATION RATE: 18 BRPM | SYSTOLIC BLOOD PRESSURE: 170 MMHG

## 2023-09-06 DIAGNOSIS — N30.01 ACUTE HEMORRHAGIC CYSTITIS: ICD-10-CM

## 2023-09-06 PROBLEM — R79.89 ABNORMAL LIVER FUNCTION TESTS: Status: ACTIVE | Noted: 2017-12-07

## 2023-09-06 PROBLEM — K76.0 FATTY LIVER: Status: ACTIVE | Noted: 2018-09-05

## 2023-09-06 LAB
ALBUMIN UR-MCNC: 30 MG/DL
ANION GAP SERPL CALCULATED.3IONS-SCNC: 11 MMOL/L (ref 7–15)
APPEARANCE UR: ABNORMAL
BACTERIA #/AREA URNS HPF: ABNORMAL /HPF
BASOPHILS # BLD AUTO: 0.1 10E3/UL (ref 0–0.2)
BASOPHILS NFR BLD AUTO: 1 %
BILIRUB UR QL STRIP: NEGATIVE
BUN SERPL-MCNC: 14.1 MG/DL (ref 8–23)
CALCIUM SERPL-MCNC: 8.9 MG/DL (ref 8.8–10.2)
CHLORIDE SERPL-SCNC: 107 MMOL/L (ref 98–107)
COLOR UR AUTO: ABNORMAL
CREAT SERPL-MCNC: 1.04 MG/DL (ref 0.67–1.17)
DEPRECATED HCO3 PLAS-SCNC: 20 MMOL/L (ref 22–29)
EGFRCR SERPLBLD CKD-EPI 2021: 77 ML/MIN/1.73M2
EOSINOPHIL # BLD AUTO: 0.3 10E3/UL (ref 0–0.7)
EOSINOPHIL NFR BLD AUTO: 3 %
ERYTHROCYTE [DISTWIDTH] IN BLOOD BY AUTOMATED COUNT: 13 % (ref 10–15)
GLUCOSE SERPL-MCNC: 94 MG/DL (ref 70–99)
GLUCOSE UR STRIP-MCNC: NEGATIVE MG/DL
HCT VFR BLD AUTO: 39.5 % (ref 40–53)
HGB BLD-MCNC: 13.6 G/DL (ref 13.3–17.7)
HGB UR QL STRIP: ABNORMAL
HOLD SPECIMEN: NORMAL
IMM GRANULOCYTES # BLD: 0.1 10E3/UL
IMM GRANULOCYTES NFR BLD: 1 %
KETONES UR STRIP-MCNC: NEGATIVE MG/DL
LEUKOCYTE ESTERASE UR QL STRIP: NEGATIVE
LYMPHOCYTES # BLD AUTO: 3.7 10E3/UL (ref 0.8–5.3)
LYMPHOCYTES NFR BLD AUTO: 36 %
MCH RBC QN AUTO: 31.3 PG (ref 26.5–33)
MCHC RBC AUTO-ENTMCNC: 34.4 G/DL (ref 31.5–36.5)
MCV RBC AUTO: 91 FL (ref 78–100)
MONOCYTES # BLD AUTO: 0.8 10E3/UL (ref 0–1.3)
MONOCYTES NFR BLD AUTO: 8 %
NEUTROPHILS # BLD AUTO: 5.3 10E3/UL (ref 1.6–8.3)
NEUTROPHILS NFR BLD AUTO: 51 %
NITRATE UR QL: NEGATIVE
NRBC # BLD AUTO: 0 10E3/UL
NRBC BLD AUTO-RTO: 0 /100
PH UR STRIP: 5.5 [PH] (ref 5–9)
PLATELET # BLD AUTO: 188 10E3/UL (ref 150–450)
POTASSIUM SERPL-SCNC: 4 MMOL/L (ref 3.4–5.3)
RBC # BLD AUTO: 4.34 10E6/UL (ref 4.4–5.9)
RBC URINE: >182 /HPF
SODIUM SERPL-SCNC: 138 MMOL/L (ref 136–145)
SP GR UR STRIP: 1.01 (ref 1–1.03)
UROBILINOGEN UR STRIP-MCNC: NORMAL MG/DL
WBC # BLD AUTO: 10.1 10E3/UL (ref 4–11)
WBC URINE: 6 /HPF

## 2023-09-06 PROCEDURE — 99283 EMERGENCY DEPT VISIT LOW MDM: CPT | Performed by: FAMILY MEDICINE

## 2023-09-06 PROCEDURE — 36415 COLL VENOUS BLD VENIPUNCTURE: CPT | Performed by: FAMILY MEDICINE

## 2023-09-06 PROCEDURE — 80048 BASIC METABOLIC PNL TOTAL CA: CPT | Performed by: FAMILY MEDICINE

## 2023-09-06 PROCEDURE — 85025 COMPLETE CBC W/AUTO DIFF WBC: CPT | Performed by: FAMILY MEDICINE

## 2023-09-06 PROCEDURE — 99284 EMERGENCY DEPT VISIT MOD MDM: CPT | Performed by: FAMILY MEDICINE

## 2023-09-06 PROCEDURE — 81001 URINALYSIS AUTO W/SCOPE: CPT | Performed by: FAMILY MEDICINE

## 2023-09-06 RX ORDER — IOPAMIDOL 755 MG/ML
116 INJECTION, SOLUTION INTRAVASCULAR ONCE
Status: DISCONTINUED | OUTPATIENT
Start: 2023-09-06 | End: 2023-09-07 | Stop reason: HOSPADM

## 2023-09-06 RX ORDER — CIPROFLOXACIN 500 MG/1
500 TABLET, FILM COATED ORAL 2 TIMES DAILY
Qty: 20 TABLET | Refills: 0 | Status: SHIPPED | OUTPATIENT
Start: 2023-09-06

## 2023-09-06 ASSESSMENT — ACTIVITIES OF DAILY LIVING (ADL): ADLS_ACUITY_SCORE: 35

## 2023-09-07 ASSESSMENT — ENCOUNTER SYMPTOMS
FREQUENCY: 1
HEMATURIA: 1

## 2023-09-07 NOTE — DISCHARGE INSTRUCTIONS
The blood in your urine is likely from a urinary tract infection.  I have prescribed an antibiotic called ciprofloxacin.  Please take this 2 hours separately from your multivitamin or hold off on your multivitamin for the next 1 week.  Take the antibiotic for at least 7 days.  You will get a 10-day treatment as that is what we have available in our machine here tonight.  I have entered a referral to urology.  If you have any concerns, if the bleeding returns or if you are unable to urinate please return to the emergency room.

## 2023-09-07 NOTE — ED TRIAGE NOTES
Pt arrives with complaints of dark pink bloody urine that started earlier this morning. Pt is able to pass urine, does not have pain, and does not take blood thinners. Pt did do some heavy lifting yesterday.   BP (!) 170/107   Pulse 65   Temp 97.8  F (36.6  C) (Tympanic)   Resp 18   Wt 90.7 kg (200 lb)   SpO2 97%   BMI 28.70 kg/m        Triage Assessment       Row Name 09/06/23 9206       Triage Assessment (Adult)    Airway WDL WDL       Respiratory WDL    Respiratory WDL WDL       Skin Circulation/Temperature WDL    Skin Circulation/Temperature WDL WDL       Cardiac WDL    Cardiac WDL WDL       Peripheral/Neurovascular WDL    Peripheral Neurovascular WDL WDL       Cognitive/Neuro/Behavioral WDL    Cognitive/Neuro/Behavioral WDL WDL

## 2023-09-07 NOTE — ED PROVIDER NOTES
History     Chief Complaint   Patient presents with    Hematuria     HPI  Wilfrid Liao is a 72 year old male who presented for an episode of hematuria.  He noted earlier today.  They are driving from [place] to [place].  He has never had anything like this in the past.  Since he noticed the light reddish discoloration he is also noticed some increasing urinary frequency.  Denies dysuria.  He is not on any blood thinners.  He did lift something heavy yesterday and was wondering if it could contribute.  He was never a smoker or drug user.  He works as a .  Uses occasional ibuprofen.  He is here with his wife.  They are traveling down to the city for the weekend.  No prior history of urinary tract infections.  He has noted increased duration of urination and weaker stream.  That has been ongoing for at least 1 year though.    Allergies:  Allergies   Allergen Reactions    Atorvastatin GI Disturbance     vomiting    Atorvastatin Calcium Other (See Comments)     Lipitor  Myalgia    Morphine Other (See Comments)       Problem List:    Patient Active Problem List    Diagnosis Date Noted    Fatty liver 09/05/2018     Priority: Medium    Abnormal liver function tests 12/07/2017     Priority: Medium    Other specified abnormal findings of blood chemistry 12/24/2016     Priority: Medium    Cobalamin deficiency 12/20/2016     Priority: Medium    Bilateral tinnitus 10/14/2016     Priority: Medium    Hard of hearing, bilateral 10/14/2016     Priority: Medium    Crohn's disease of small intestine with complication (H) 02/05/2016     Priority: Medium    Rotator cuff tear arthropathy of right shoulder 07/08/2015     Priority: Medium    Gastroenteritis 01/25/2015     Priority: Medium    Primary hypertension 11/10/2014     Priority: Medium    Crohn's disease (H) 06/27/2014     Priority: Medium    Chronic cholecystitis 01/07/2014     Priority: Medium    Essential hypertension 02/23/2012     Priority: Medium      Problem list name updated by automated process. Provider to review      Hyperlipidemia 02/23/2012     Priority: Medium     Problem list name updated by automated process. Provider to review          Past Medical History:    Past Medical History:   Diagnosis Date    Bell's palsy 02/23/2012    Crohn's disease (H) 6/27/2014    Diverticulosis of colon  02/23/2012    Eczema 02/23/2012    HTN (Hypertension) 02/23/2012    Hyperlipidemia 02/23/2012    Inguinal hernia without mention of obstruction or gangrene, unilateral or unspecified, (not specified as recurrent) 01/17/2006       Past Surgical History:    Past Surgical History:   Procedure Laterality Date    COLONOSCOPY  2000    COLONOSCOPY  2011    REPEAT 2021    COLONOSCOPY  4/23/2014    HERNIA REPAIR, INGUINAL RT/LT      LEFT    LAPAROSCOPIC CHOLECYSTECTOMY  12/26/2013    Procedure: LAPAROSCOPIC CHOLECYSTECTOMY;  LAPAROSCOPIC CHOLECYSTECTOMY;  Surgeon: Tiffany Matamoros DO;  Location: [facility] OR       Family History:    Family History   Problem Relation Age of Onset    Colon Polyps Brother     Diabetes Brother     Heart Disease Brother         Myocardial Infarction    Hypertension Sister        Social History:  Marital Status:   [2]  Social History     Tobacco Use    Smoking status: Former     Types: Cigarettes    Tobacco comments:     Tried to Quit (YES)   Substance Use Topics    Alcohol use: No        Medications:    ciprofloxacin (CIPRO) 500 MG tablet  azithromycin (ZITHROMAX) 250 MG tablet  COCONUT OIL PO  cyanocobalamin (VITAMIN B12) 1000 MCG/ML injection  lisinopril (PRINIVIL/ZESTRIL) 10 MG tablet  lisinopril (PRINIVIL/ZESTRIL) 20 MG tablet  Multiple Vitamins-Minerals (MULTIVITAMIN OR)  Probiotic Product (PROBIOTIC FORMULA) CAPS  Turmeric, Curcuma Longa, (CURCUMIN) POWD  VITAMIN D, CHOLECALCIFEROL, PO          Review of Systems   Genitourinary:  Positive for frequency and hematuria.       Physical Exam   BP: (!) 170/107  Pulse: 65  Temp: 97.8  F (36.6   C)  Resp: 18  Weight: 90.7 kg (200 lb)  SpO2: 97 %      Physical Exam  Constitutional:       General: He is not in acute distress.     Appearance: Normal appearance. He is obese. He is not toxic-appearing.   HENT:      Head: Normocephalic and atraumatic.      Mouth/Throat:      Mouth: Mucous membranes are moist.   Eyes:      General: No scleral icterus.     Conjunctiva/sclera: Conjunctivae normal.   Cardiovascular:      Rate and Rhythm: Normal rate and regular rhythm.      Heart sounds: Normal heart sounds.   Pulmonary:      Effort: Pulmonary effort is normal. No respiratory distress.      Breath sounds: Normal breath sounds.   Abdominal:      General: Abdomen is flat. There is no distension.      Palpations: Abdomen is soft. There is no mass.      Tenderness: There is no abdominal tenderness.   Musculoskeletal:         General: No deformity.      Cervical back: Neck supple.   Skin:     General: Skin is warm.   Neurological:      Mental Status: He is alert.         ED Course              ED Course as of 09/07/23 0120   Wed Sep 06, 2023   2312 Hematuria x1 day.  Urgency.  Non-smoker.  No known history of cancer.  Occasional NSAID use.  Otherwise not on blood thinners.   2326 Patient appears have urinary tract infection.  Discussed at length.  He would like to follow-up outpatient.  Will defer any imaging at this time.     Procedures              Critical Care time:  none               Results for orders placed or performed during the hospital encounter of 09/06/23 (from the past 24 hour(s))   UA with Microscopic reflex to Culture    Specimen: Urine, Clean Catch   Result Value Ref Range    Color Urine Light Brown (A) Colorless, Straw, Light Yellow, Yellow    Appearance Urine Slightly Cloudy (A) Clear    Glucose Urine Negative Negative mg/dL    Bilirubin Urine Negative Negative    Ketones Urine Negative Negative mg/dL    Specific Gravity Urine 1.007 1.000 - 1.030    Blood Urine Large (A) Negative    pH Urine 5.5 5.0 -  9.0    Protein Albumin Urine 30 (A) Negative mg/dL    Urobilinogen Urine Normal Normal, 2.0 mg/dL    Nitrite Urine Negative Negative    Leukocyte Esterase Urine Negative Negative    Bacteria Urine Few (A) None Seen /HPF    RBC Urine >182 (H) <=2 /HPF    WBC Urine 6 (H) <=5 /HPF    Narrative    Urine Culture not indicated   CBC with platelets differential    Narrative    The following orders were created for panel order CBC with platelets differential.  Procedure                               Abnormality         Status                     ---------                               -----------         ------                     CBC with platelets and d...[[id number]]  Abnormal            Final result                 Please view results for these tests on the individual orders.   Basic metabolic panel   Result Value Ref Range    Sodium 138 136 - 145 mmol/L    Potassium 4.0 3.4 - 5.3 mmol/L    Chloride 107 98 - 107 mmol/L    Carbon Dioxide (CO2) 20 (L) 22 - 29 mmol/L    Anion Gap 11 7 - 15 mmol/L    Urea Nitrogen 14.1 8.0 - 23.0 mg/dL    Creatinine 1.04 0.67 - 1.17 mg/dL    Calcium 8.9 8.8 - 10.2 mg/dL    Glucose 94 70 - 99 mg/dL    GFR Estimate 77 >60 mL/min/1.73m2   Extra Tube    Narrative    The following orders were created for panel order Extra Tube.  Procedure                               Abnormality         Status                     ---------                               -----------         ------                     Extra Blue Top Tube[[id number]]                              Final result               Extra Red Top Tube[[id number]]                               Final result               Extra Green Top (Lithium...[[id number]]                      Final result                 Please view results for these tests on the individual orders.   Extra Blue Top Tube   Result Value Ref Range    Hold Specimen [facility]    Extra Red Top Tube   Result Value Ref Range    Hold Specimen [facility]    Extra Green Top (Lithium Heparin) Tube    Result Value Ref Range    Hold Specimen [facility]    CBC with platelets and differential   Result Value Ref Range    WBC Count 10.1 4.0 - 11.0 10e3/uL    RBC Count 4.34 (L) 4.40 - 5.90 10e6/uL    Hemoglobin 13.6 13.3 - 17.7 g/dL    Hematocrit 39.5 (L) 40.0 - 53.0 %    MCV 91 78 - 100 fL    MCH 31.3 26.5 - 33.0 pg    MCHC 34.4 31.5 - 36.5 g/dL    RDW 13.0 10.0 - 15.0 %    Platelet Count 188 150 - 450 10e3/uL    % Neutrophils 51 %    % Lymphocytes 36 %    % Monocytes 8 %    % Eosinophils 3 %    % Basophils 1 %    % Immature Granulocytes 1 %    NRBCs per 100 WBC 0 <1 /100    Absolute Neutrophils 5.3 1.6 - 8.3 10e3/uL    Absolute Lymphocytes 3.7 0.8 - 5.3 10e3/uL    Absolute Monocytes 0.8 0.0 - 1.3 10e3/uL    Absolute Eosinophils 0.3 0.0 - 0.7 10e3/uL    Absolute Basophils 0.1 0.0 - 0.2 10e3/uL    Absolute Immature Granulocytes 0.1 <=0.4 10e3/uL    Absolute NRBCs 0.0 10e3/uL       Medications   iopamidol (ISOVUE-370) solution 116 mL (has no administration in time range)       Assessments & Plan (with Medical Decision Making)     I have reviewed the nursing notes.    I have reviewed the findings, diagnosis, plan and need for follow up with the patient.           Medical Decision Making  The patient's presentation was of low complexity (2+ clearly self-limited or minor problems).    The patient's evaluation involved:  strong consideration of a test (discussed CT urogram) that was ultimately deferred  ordering and/or review of 3+ test(s) in this encounter (see separate area of note for details)    The patient's management necessitated moderate risk (prescription drug management including medications given in the ED).        Discharge Medication List as of 9/6/2023 11:30 PM        START taking these medications    Details   ciprofloxacin (CIPRO) 500 MG tablet Take 1 tablet (500 mg) by mouth 2 times daily, Disp-20 tablet, R-0, [facility]             Final diagnoses:   Acute hemorrhagic cystitis   Hematuria presumably from acute  cystitis.  He is never had this in the past.  Symptoms only present for 1 day.  He is still able to pass his urine without difficulty.  Has not noted a fever.  No back pain.  No significant risk factors including he has never been a smoker.  Discussed plan of care with patient.  Discussed potential CT urogram with hematuria, however given that it appears to be an acute infection he would like to defer this.  He will follow-up with urology if he has any worsening symptoms.  We will treat with ciprofloxacin, await urine culture.  We will call if change of antibiotic is necessary.    9/6/2023   [facility]       Mila Mendez DO  09/07/23 0121

## 2023-10-31 ENCOUNTER — TELEPHONE (OUTPATIENT)
Dept: CARDIOLOGY | Facility: OTHER | Age: 73
End: 2023-10-31
Payer: COMMERCIAL

## 2023-10-31 NOTE — TELEPHONE ENCOUNTER
Patient verified .  Reminder call for stress test with instructions given.  Patient verbalized understanding.

## 2023-11-03 ENCOUNTER — HOSPITAL ENCOUNTER (OUTPATIENT)
Dept: CT IMAGING | Facility: OTHER | Age: 73
Discharge: HOME | End: 2023-11-03
Attending: FAMILY MEDICINE | Admitting: FAMILY MEDICINE
Payer: MEDICARE

## 2023-11-03 VITALS
OXYGEN SATURATION: 95 % | DIASTOLIC BLOOD PRESSURE: 84 MMHG | SYSTOLIC BLOOD PRESSURE: 148 MMHG | HEIGHT: 70 IN | BODY MASS INDEX: 28.63 KG/M2 | WEIGHT: 200 LBS | HEART RATE: 61 BPM

## 2023-11-03 DIAGNOSIS — I77.89 OTHER SPECIFIED DISORDERS OF ARTERIES AND ARTERIOLES (H): ICD-10-CM

## 2023-11-03 PROCEDURE — 250N000011 HC RX IP 250 OP 636: Performed by: FAMILY MEDICINE

## 2023-11-03 PROCEDURE — 76377 3D RENDER W/INTRP POSTPROCES: CPT

## 2023-11-03 RX ORDER — METOPROLOL TARTRATE 1 MG/ML
10 INJECTION, SOLUTION INTRAVENOUS
Status: DISCONTINUED | OUTPATIENT
Start: 2023-11-03 | End: 2023-11-04 | Stop reason: HOSPADM

## 2023-11-03 RX ORDER — METOPROLOL TARTRATE 100 MG
100 TABLET ORAL
Status: DISCONTINUED | OUTPATIENT
Start: 2023-11-03 | End: 2023-11-04 | Stop reason: HOSPADM

## 2023-11-03 RX ORDER — IOPAMIDOL 755 MG/ML
100 INJECTION, SOLUTION INTRAVASCULAR ONCE
Status: COMPLETED | OUTPATIENT
Start: 2023-11-03 | End: 2023-11-03

## 2023-11-03 RX ADMIN — IOPAMIDOL 100 ML: 755 INJECTION, SOLUTION INTRAVENOUS at 07:30

## 2023-11-03 NOTE — PROGRESS NOTES
0700 Patient arrived for a CTA. . They were connected to a cardiac monitor and vital signs were obtained. The patient's heart rate was 66 . Medications and allergies were reviewed. . The procedure was explained, and the patient was instructed to rest and relax, as much as possible. A saline lock was established. The patient's heart rate 66. The patient was offered the restroom, and then they were wheeled to CT in a wheelchair.  The cardiac monitor was placed there, and the test was completed. The patient was given one bottle of water, and they were told to  Drink at least 3 other additional glasses of water today, per post contrast instructions.  The patient was instructed that the ordering MD will call with results in one to two days with test results. The patient left ambulatory in stable condition.

## 2023-11-03 NOTE — DISCHARGE INSTRUCTIONS
IV Contrast- Discharge Instructions After Your CT Scan      The IV contrast you received today will be filtered from your bloodstream by your kidneys during the next 24 hours and pass from the body in urine.  You will not be aware of this process and your urine will not change in color.  To help this process you should drink at least 4 additional glasses of water or juice today.  This reduces stress on your kidneys.    Most contrast reactions are immediate.  Should you develop symptoms of concern after discharge, contact the department at the number below.  After hours you should contact your personal physician.  If you develop breathing distress or wheezing, call 911.  IV Contrast- Discharge Instructions After Your CT Scan      The IV contrast you received today will be filtered from your bloodstream by your kidneys during the next 24 hours and pass from the body in urine.  You will not be aware of this process and your urine will not change in color.  To help this process you should drink at least 4 additional glasses of water or juice today.  This reduces stress on your kidneys.    Most contrast reactions are immediate.  Should you develop symptoms of concern after discharge, contact the department at the number below.  After hours you should contact your personal physician.  If you develop breathing distress or wheezing, call 911.

## 2024-10-24 ENCOUNTER — TRANSFERRED RECORDS (OUTPATIENT)
Dept: HEALTH INFORMATION MANAGEMENT | Facility: CLINIC | Age: 74
End: 2024-10-24
Payer: COMMERCIAL

## 2024-10-24 LAB
ALT SERPL-CCNC: 70 U/L (ref 10–65)
AST SERPL-CCNC: 53 U/L (ref 10–40)
CHOLESTEROL (EXTERNAL): 139 MG/DL
CREATININE (EXTERNAL): 0.91 MG/DL (ref 0.8–1.5)
GFR ESTIMATED (EXTERNAL): 90 ML/MIN/1.73M2
GLUCOSE (EXTERNAL): 87 MG/DL (ref 70–100)
HDLC SERPL-MCNC: 33 MG/DL
LDL CHOLESTEROL CALCULATED (EXTERNAL): 32 MG/DL
POTASSIUM (EXTERNAL): 3.5 MMOL/L (ref 3.5–5.1)
TRIGLYCERIDES (EXTERNAL): 371 MG/DL
TSH SERPL-ACNC: 3.59 UIU/ML (ref 0.35–4.8)

## 2024-10-28 ENCOUNTER — MEDICAL CORRESPONDENCE (OUTPATIENT)
Dept: CT IMAGING | Facility: HOSPITAL | Age: 74
End: 2024-10-28

## 2024-11-05 ENCOUNTER — MEDICAL CORRESPONDENCE (OUTPATIENT)
Dept: CT IMAGING | Facility: HOSPITAL | Age: 74
End: 2024-11-05

## 2024-11-07 ENCOUNTER — HOSPITAL ENCOUNTER (OUTPATIENT)
Dept: CT IMAGING | Facility: HOSPITAL | Age: 74
Discharge: HOME | End: 2024-11-07
Attending: FAMILY MEDICINE | Admitting: FAMILY MEDICINE
Payer: MEDICARE

## 2024-11-07 DIAGNOSIS — I71.20 THORACIC AORTIC ANEURYSM (H): ICD-10-CM

## 2024-11-07 PROCEDURE — 250N000011 HC RX IP 250 OP 636: Performed by: RADIOLOGY

## 2024-11-07 PROCEDURE — 71275 CT ANGIOGRAPHY CHEST: CPT

## 2024-11-07 RX ORDER — IOPAMIDOL 755 MG/ML
100 INJECTION, SOLUTION INTRAVASCULAR ONCE
Status: COMPLETED | OUTPATIENT
Start: 2024-11-07 | End: 2024-11-07

## 2024-11-07 RX ADMIN — IOPAMIDOL 100 ML: 755 INJECTION, SOLUTION INTRAVENOUS at 08:24

## 2025-01-02 ENCOUNTER — OFFICE VISIT (OUTPATIENT)
Dept: FAMILY MEDICINE | Facility: OTHER | Age: 75
End: 2025-01-02
Attending: FAMILY MEDICINE
Payer: MEDICARE

## 2025-01-02 VITALS
SYSTOLIC BLOOD PRESSURE: 120 MMHG | HEART RATE: 78 BPM | RESPIRATION RATE: 19 BRPM | TEMPERATURE: 98.8 F | DIASTOLIC BLOOD PRESSURE: 70 MMHG | BODY MASS INDEX: 29.99 KG/M2 | OXYGEN SATURATION: 96 % | WEIGHT: 209 LBS

## 2025-01-02 DIAGNOSIS — R05.1 ACUTE COUGH: Primary | ICD-10-CM

## 2025-01-02 DIAGNOSIS — J20.9 ACUTE BRONCHITIS, UNSPECIFIED ORGANISM: ICD-10-CM

## 2025-01-02 DIAGNOSIS — R50.9 FEVER, UNSPECIFIED FEVER CAUSE: ICD-10-CM

## 2025-01-02 LAB
FLUAV RNA SPEC QL NAA+PROBE: POSITIVE
FLUBV RNA RESP QL NAA+PROBE: NEGATIVE
RSV RNA SPEC NAA+PROBE: NEGATIVE
SARS-COV-2 RNA RESP QL NAA+PROBE: POSITIVE

## 2025-01-02 PROCEDURE — G0463 HOSPITAL OUTPT CLINIC VISIT: HCPCS | Mod: 25

## 2025-01-02 PROCEDURE — 87637 SARSCOV2&INF A&B&RSV AMP PRB: CPT | Mod: ZL | Performed by: FAMILY MEDICINE

## 2025-01-02 RX ORDER — BENZONATATE 200 MG/1
200 CAPSULE ORAL 3 TIMES DAILY PRN
Qty: 30 CAPSULE | Refills: 0 | Status: SHIPPED | OUTPATIENT
Start: 2025-01-02 | End: 2025-01-12

## 2025-01-02 RX ORDER — AZITHROMYCIN 250 MG/1
TABLET, FILM COATED ORAL
Qty: 6 TABLET | Refills: 0 | Status: SHIPPED | OUTPATIENT
Start: 2025-01-02 | End: 2025-01-07

## 2025-01-02 RX ORDER — LOSARTAN POTASSIUM 25 MG/1
25 TABLET ORAL DAILY
COMMUNITY

## 2025-01-02 ASSESSMENT — PATIENT HEALTH QUESTIONNAIRE - PHQ9
10. IF YOU CHECKED OFF ANY PROBLEMS, HOW DIFFICULT HAVE THESE PROBLEMS MADE IT FOR YOU TO DO YOUR WORK, TAKE CARE OF THINGS AT HOME, OR GET ALONG WITH OTHER PEOPLE: SOMEWHAT DIFFICULT
SUM OF ALL RESPONSES TO PHQ QUESTIONS 1-9: 5
SUM OF ALL RESPONSES TO PHQ QUESTIONS 1-9: 5

## 2025-01-02 ASSESSMENT — ENCOUNTER SYMPTOMS
SHORTNESS OF BREATH: 0
BACK PAIN: 0
PALPITATIONS: 0
SORE THROAT: 0
FEVER: 1
DYSURIA: 0
SEIZURES: 0
COLOR CHANGE: 0
VOMITING: 0
COUGH: 1
CHILLS: 1
HEMATURIA: 0
FATIGUE: 1
ABDOMINAL PAIN: 0
ARTHRALGIAS: 0
EYE PAIN: 0

## 2025-01-02 ASSESSMENT — ANXIETY QUESTIONNAIRES
7. FEELING AFRAID AS IF SOMETHING AWFUL MIGHT HAPPEN: NOT AT ALL
GAD7 TOTAL SCORE: 0
4. TROUBLE RELAXING: NOT AT ALL
7. FEELING AFRAID AS IF SOMETHING AWFUL MIGHT HAPPEN: NOT AT ALL
GAD7 TOTAL SCORE: 0
8. IF YOU CHECKED OFF ANY PROBLEMS, HOW DIFFICULT HAVE THESE MADE IT FOR YOU TO DO YOUR WORK, TAKE CARE OF THINGS AT HOME, OR GET ALONG WITH OTHER PEOPLE?: NOT DIFFICULT AT ALL
GAD7 TOTAL SCORE: 0
1. FEELING NERVOUS, ANXIOUS, OR ON EDGE: NOT AT ALL
6. BECOMING EASILY ANNOYED OR IRRITABLE: NOT AT ALL
IF YOU CHECKED OFF ANY PROBLEMS ON THIS QUESTIONNAIRE, HOW DIFFICULT HAVE THESE PROBLEMS MADE IT FOR YOU TO DO YOUR WORK, TAKE CARE OF THINGS AT HOME, OR GET ALONG WITH OTHER PEOPLE: NOT DIFFICULT AT ALL
5. BEING SO RESTLESS THAT IT IS HARD TO SIT STILL: NOT AT ALL
2. NOT BEING ABLE TO STOP OR CONTROL WORRYING: NOT AT ALL
3. WORRYING TOO MUCH ABOUT DIFFERENT THINGS: NOT AT ALL

## 2025-01-02 NOTE — PROGRESS NOTES
Assessment & Plan   Problem List Items Addressed This Visit    None  Visit Diagnoses       Acute cough    -  Primary    Relevant Orders    XR Chest 2 Views (Completed)    B. pertussis/parapertussis PCR-NP    Influenza A/B, RSV and SARS-CoV2 PCR (COVID-19) Nose    Fever, unspecified fever cause        Relevant Orders    B. pertussis/parapertussis PCR-NP    Influenza A/B, RSV and SARS-CoV2 PCR (COVID-19) Nose    Acute bronchitis, unspecified organism        Relevant Medications    azithromycin (ZITHROMAX) 250 MG tablet    benzonatate (TESSALON) 200 MG capsule              Diagnoses and all orders for this visit:  Acute cough  -     XR Chest 2 Views; Future  -     B. pertussis/parapertussis PCR-NP  -     Influenza A/B, RSV and SARS-CoV2 PCR (COVID-19) Nose  Fever, unspecified fever cause  -     B. pertussis/parapertussis PCR-NP  -     Influenza A/B, RSV and SARS-CoV2 PCR (COVID-19) Nose  Acute bronchitis, unspecified organism  -     azithromycin (ZITHROMAX) 250 MG tablet; Take 2 tablets (500 mg) by mouth daily for 1 day, THEN 1 tablet (250 mg) daily for 4 days.  -     benzonatate (TESSALON) 200 MG capsule; Take 1 capsule (200 mg) by mouth 3 times daily as needed for cough.        Check for RSV COVID and influenza.  Also I will do a PCR swab for pertussis.  I am going to start azithromycin and Tessalon Perles.      Subjective   Wilfrid Liang A 74 year old male    Patient presents for complaint of cough.  He has had a nonproductive cough for approximately 4 days.  He states he has had a fever and chills.  Fever stopped and measured.  He has chronic diarrhea.  He states that he was sick about 3 weeks prior.  He has a history of recurrent pneumonia being on immunosuppressive drugs.  He states this feels like his pneumonia.  Immunizations are up-to-date    History of Present Illness       Reason for visit:  Cough and upper respiratory  Symptom onset:  1-3 days ago  Symptom intensity:  Moderate  Symptom progression:   Worsening   He is taking medications regularly.    Past Medical History:   Diagnosis Date    Bell's palsy 02/23/2012    Crohn's disease (H) 6/27/2014    Diverticulosis of colon  02/23/2012    Eczema 02/23/2012    HTN (Hypertension) 02/23/2012    Hyperlipidemia 02/23/2012    Inguinal hernia without mention of obstruction or gangrene, unilateral or unspecified, (not specified as recurrent) 01/17/2006      reports that he has quit smoking. His smoking use included cigarettes. He does not have any smokeless tobacco history on file. He reports that he does not drink alcohol.  Family History   Problem Relation Age of Onset    Colon Polyps Brother     Diabetes Brother     Heart Disease Brother         Myocardial Infarction    Hypertension Sister      Allergies   Allergen Reactions    Atorvastatin GI Disturbance     vomiting    Atorvastatin Calcium Other (See Comments)     Lipitor  Myalgia    Morphine Other (See Comments)       Current Outpatient Medications:     azithromycin (ZITHROMAX) 250 MG tablet, Take 2 tablets (500 mg) by mouth daily for 1 day, THEN 1 tablet (250 mg) daily for 4 days., Disp: 6 tablet, Rfl: 0    benzonatate (TESSALON) 200 MG capsule, Take 1 capsule (200 mg) by mouth 3 times daily as needed for cough., Disp: 30 capsule, Rfl: 0    azithromycin (ZITHROMAX) 250 MG tablet, As directed (Patient not taking: Reported on 1/2/2025), Disp: 6 tablet, Rfl: 0    ciprofloxacin (CIPRO) 500 MG tablet, Take 1 tablet (500 mg) by mouth 2 times daily (Patient not taking: Reported on 1/2/2025), Disp: 20 tablet, Rfl: 0    COCONUT OIL PO, , Disp: , Rfl:     cyanocobalamin (VITAMIN B12) 1000 MCG/ML injection, Inject 1 mL (1,000 mcg) into the muscle every 30 days, Disp: 1 mL, Rfl: 10    lisinopril (PRINIVIL/ZESTRIL) 10 MG tablet, TAKE ONE TABLET BY MOUTH ONCE DAILY (Patient not taking: Reported on 1/2/2025), Disp: 90 tablet, Rfl: 0    lisinopril (PRINIVIL/ZESTRIL) 20 MG tablet, Take 1 tablet (20 mg) by mouth daily (Patient not  taking: Reported on 1/2/2025), Disp: 90 tablet, Rfl: 0    losartan (COZAAR) 25 MG tablet, Take 25 mg by mouth daily., Disp: , Rfl:     Multiple Vitamins-Minerals (MULTIVITAMIN OR), Take 1 tablet by mouth daily., Disp: , Rfl:     Probiotic Product (PROBIOTIC FORMULA) CAPS, Take 1 tablet by mouth daily , Disp: , Rfl:     Turmeric, Curcuma Longa, (CURCUMIN) POWD, 1 tablet, Disp: , Rfl:     VITAMIN D, CHOLECALCIFEROL, PO, Take 2,000 Units by mouth daily, Disp: , Rfl:   Review of Systems   Constitutional:  Positive for chills, fatigue and fever.   HENT:  Negative for ear pain and sore throat.    Eyes:  Negative for pain and visual disturbance.   Respiratory:  Positive for cough. Negative for shortness of breath.    Cardiovascular:  Negative for chest pain and palpitations.   Gastrointestinal:  Negative for abdominal pain and vomiting.   Genitourinary:  Negative for dysuria and hematuria.   Musculoskeletal:  Negative for arthralgias and back pain.   Skin:  Negative for color change and rash.   Neurological:  Negative for seizures and syncope.   All other systems reviewed and are negative.        Objective      /70 (BP Location: Right arm, Patient Position: Sitting, Cuff Size: Adult Large)   Pulse 78   Temp 98.8  F (37.1  C) (Temporal)   Resp 19   Wt 94.8 kg (209 lb)   SpO2 96%   BMI 29.99 kg/m    Body mass index is 29.99 kg/m .  Physical Exam  Constitutional:       Appearance: Normal appearance.   HENT:      Head: Normocephalic.      Right Ear: Tympanic membrane normal.      Left Ear: Tympanic membrane normal.      Nose: Nose normal.      Mouth/Throat:      Mouth: Mucous membranes are moist.      Pharynx: Oropharynx is clear.   Eyes:      Conjunctiva/sclera: Conjunctivae normal.   Cardiovascular:      Rate and Rhythm: Normal rate and regular rhythm.      Heart sounds: Normal heart sounds.   Pulmonary:      Effort: Pulmonary effort is normal. No respiratory distress.      Breath sounds: No stridor. Rales (Right  "upper lobe.) present.      Comments: Bronchial adventitious sounds bilaterally  Abdominal:      General: Abdomen is flat.      Palpations: Abdomen is soft.   Musculoskeletal:         General: Normal range of motion.      Cervical back: Neck supple.   Skin:     General: Skin is warm and dry.   Neurological:      General: No focal deficit present.      Mental Status: He is alert and oriented to person, place, and time. Mental status is at baseline.   Psychiatric:         Mood and Affect: Mood normal.         Behavior: Behavior normal.         Lab Results   Component Value Date    WBC 10.1 09/06/2023    HGB 13.6 09/06/2023     09/06/2023    CHOL 134 06/27/2014    TRIG 371 (H) 10/24/2024    HDL 32 (L) 06/27/2014    ALT 64 (H) 06/15/2017    AST 47 06/15/2017     09/06/2023    BUN 14.1 09/06/2023    CO2 20 (L) 09/06/2023    PSA 0.85 05/06/2013       Transferred Records on 10/24/2024   Component Date Value Ref Range Status    TSH (External) 10/24/2024 3.590  0.350 - 4.800 uIU/mL Final    Cholesterol (External) 10/24/2024 139  <200 mg/dL Final    Triglycerides (External) 10/24/2024 371 (H)  <150 mg/dL Final    HDL Cholesterol (External) 10/24/2024 33 (L)  >=60 mg/dL Final    LDL Cholesterol Calculated (Extern* 10/24/2024 32  <100 mg/dL Final    ALT (External) 10/24/2024 70 (H)  10 - 65 U/L Final    AST (External) 10/24/2024 53 (H)  10 - 40 U/L Final    Creatinine (External) 10/24/2024 0.91  0.80 - 1.50 mg/dL Final    GFR Estimated (External) 10/24/2024 90  >90 mL/min/1.73m2 Final    Glucose (External) 10/24/2024 87  70 - 100 mg/dL Final    Potassium (External) 10/24/2024 3.5  3.5 - 5.1 mmol/L Final         No results for input(s): \"TSH\", \"UA\", \"PSA\", \"HGBA1C\" in the last 336 hours.    Invalid input(s): \"CBC\", \"CMP\", \"LIPIDS\", \"BMP\", \"MICROALBU\"       "

## 2025-01-02 NOTE — NURSING NOTE
Patient states that he has a cough since Monday, states he did have the same symptoms around Thanksgiving and got past them.   Cough is unproductive  Took Advil around 12 pm   Cough worse when laying flat

## 2025-01-04 LAB
B PARAPERT DNA SPEC QL NAA+PROBE: NOT DETECTED
B PERT DNA SPEC QL NAA+PROBE: NOT DETECTED

## 2025-02-12 ENCOUNTER — HOSPITAL ENCOUNTER (EMERGENCY)
Facility: HOSPITAL | Age: 75
Discharge: HOME | End: 2025-02-12
Attending: PHYSICIAN ASSISTANT
Payer: MEDICARE

## 2025-02-12 ENCOUNTER — APPOINTMENT (OUTPATIENT)
Dept: CT IMAGING | Facility: HOSPITAL | Age: 75
End: 2025-02-12
Attending: PHYSICIAN ASSISTANT
Payer: MEDICARE

## 2025-02-12 VITALS
TEMPERATURE: 97.6 F | BODY MASS INDEX: 29.84 KG/M2 | DIASTOLIC BLOOD PRESSURE: 83 MMHG | OXYGEN SATURATION: 94 % | RESPIRATION RATE: 16 BRPM | WEIGHT: 208 LBS | SYSTOLIC BLOOD PRESSURE: 132 MMHG | HEART RATE: 68 BPM

## 2025-02-12 DIAGNOSIS — K50.90 ACUTE CROHN'S DISEASE WITHOUT COMPLICATION (H): ICD-10-CM

## 2025-02-12 LAB
ALBUMIN SERPL BCG-MCNC: 4.2 G/DL (ref 3.5–5.2)
ALBUMIN UR-MCNC: NEGATIVE MG/DL
ALP SERPL-CCNC: 86 U/L (ref 40–150)
ALT SERPL W P-5'-P-CCNC: 67 U/L (ref 0–70)
ANION GAP SERPL CALCULATED.3IONS-SCNC: 14 MMOL/L (ref 7–15)
APPEARANCE UR: CLEAR
AST SERPL W P-5'-P-CCNC: 59 U/L (ref 0–45)
BASOPHILS # BLD AUTO: 0.1 10E3/UL (ref 0–0.2)
BASOPHILS NFR BLD AUTO: 1 %
BILIRUB SERPL-MCNC: 0.7 MG/DL
BILIRUB UR QL STRIP: NEGATIVE
BUN SERPL-MCNC: 9 MG/DL (ref 8–23)
CALCIUM SERPL-MCNC: 8.9 MG/DL (ref 8.8–10.4)
CHLORIDE SERPL-SCNC: 107 MMOL/L (ref 98–107)
COLOR UR AUTO: YELLOW
CREAT SERPL-MCNC: 1.12 MG/DL (ref 0.67–1.17)
EGFRCR SERPLBLD CKD-EPI 2021: 70 ML/MIN/1.73M2
EOSINOPHIL # BLD AUTO: 0.3 10E3/UL (ref 0–0.7)
EOSINOPHIL NFR BLD AUTO: 4 %
ERYTHROCYTE [DISTWIDTH] IN BLOOD BY AUTOMATED COUNT: 12.8 % (ref 10–15)
GLUCOSE SERPL-MCNC: 111 MG/DL (ref 70–99)
GLUCOSE UR STRIP-MCNC: NEGATIVE MG/DL
HCO3 SERPL-SCNC: 20 MMOL/L (ref 22–29)
HCT VFR BLD AUTO: 39.9 % (ref 40–53)
HGB BLD-MCNC: 13.9 G/DL (ref 13.3–17.7)
HGB UR QL STRIP: NEGATIVE
HOLD SPECIMEN: NORMAL
IMM GRANULOCYTES # BLD: 0.1 10E3/UL
IMM GRANULOCYTES NFR BLD: 1 %
KETONES UR STRIP-MCNC: NEGATIVE MG/DL
LEUKOCYTE ESTERASE UR QL STRIP: NEGATIVE
LIPASE SERPL-CCNC: 60 U/L (ref 13–60)
LYMPHOCYTES # BLD AUTO: 2.8 10E3/UL (ref 0.8–5.3)
LYMPHOCYTES NFR BLD AUTO: 32 %
MCH RBC QN AUTO: 31 PG (ref 26.5–33)
MCHC RBC AUTO-ENTMCNC: 34.8 G/DL (ref 31.5–36.5)
MCV RBC AUTO: 89 FL (ref 78–100)
MONOCYTES # BLD AUTO: 0.8 10E3/UL (ref 0–1.3)
MONOCYTES NFR BLD AUTO: 9 %
MUCOUS THREADS #/AREA URNS LPF: PRESENT /LPF
NEUTROPHILS # BLD AUTO: 4.8 10E3/UL (ref 1.6–8.3)
NEUTROPHILS NFR BLD AUTO: 54 %
NITRATE UR QL: NEGATIVE
NRBC # BLD AUTO: 0 10E3/UL
NRBC BLD AUTO-RTO: 0 /100
PH UR STRIP: 5.5 [PH] (ref 4.7–8)
PLATELET # BLD AUTO: 195 10E3/UL (ref 150–450)
POTASSIUM SERPL-SCNC: 3.8 MMOL/L (ref 3.4–5.3)
PROT SERPL-MCNC: 6.8 G/DL (ref 6.4–8.3)
RBC # BLD AUTO: 4.49 10E6/UL (ref 4.4–5.9)
RBC URINE: <1 /HPF
SODIUM SERPL-SCNC: 141 MMOL/L (ref 135–145)
SP GR UR STRIP: 1.02 (ref 1–1.03)
SQUAMOUS EPITHELIAL: 0 /HPF
UROBILINOGEN UR STRIP-MCNC: NORMAL MG/DL
WBC # BLD AUTO: 8.9 10E3/UL (ref 4–11)
WBC URINE: 1 /HPF

## 2025-02-12 PROCEDURE — 99285 EMERGENCY DEPT VISIT HI MDM: CPT | Mod: 25

## 2025-02-12 PROCEDURE — 74177 CT ABD & PELVIS W/CONTRAST: CPT

## 2025-02-12 PROCEDURE — 99284 EMERGENCY DEPT VISIT MOD MDM: CPT | Performed by: PHYSICIAN ASSISTANT

## 2025-02-12 PROCEDURE — 82565 ASSAY OF CREATININE: CPT | Performed by: PHYSICIAN ASSISTANT

## 2025-02-12 PROCEDURE — 81003 URINALYSIS AUTO W/O SCOPE: CPT | Performed by: PHYSICIAN ASSISTANT

## 2025-02-12 PROCEDURE — 84460 ALANINE AMINO (ALT) (SGPT): CPT | Performed by: PHYSICIAN ASSISTANT

## 2025-02-12 PROCEDURE — 36415 COLL VENOUS BLD VENIPUNCTURE: CPT | Performed by: PHYSICIAN ASSISTANT

## 2025-02-12 PROCEDURE — 85048 AUTOMATED LEUKOCYTE COUNT: CPT | Performed by: PHYSICIAN ASSISTANT

## 2025-02-12 PROCEDURE — 84155 ASSAY OF PROTEIN SERUM: CPT | Performed by: PHYSICIAN ASSISTANT

## 2025-02-12 PROCEDURE — 85025 COMPLETE CBC W/AUTO DIFF WBC: CPT | Performed by: PHYSICIAN ASSISTANT

## 2025-02-12 PROCEDURE — 83690 ASSAY OF LIPASE: CPT | Performed by: PHYSICIAN ASSISTANT

## 2025-02-12 PROCEDURE — 250N000011 HC RX IP 250 OP 636: Performed by: PHYSICIAN ASSISTANT

## 2025-02-12 RX ORDER — IOPAMIDOL 755 MG/ML
102 INJECTION, SOLUTION INTRAVASCULAR ONCE
Status: COMPLETED | OUTPATIENT
Start: 2025-02-12 | End: 2025-02-12

## 2025-02-12 RX ADMIN — IOPAMIDOL 102 ML: 755 INJECTION, SOLUTION INTRAVENOUS at 17:49

## 2025-02-12 ASSESSMENT — ACTIVITIES OF DAILY LIVING (ADL)
ADLS_ACUITY_SCORE: 45
ADLS_ACUITY_SCORE: 45
ADLS_ACUITY_SCORE: 41
ADLS_ACUITY_SCORE: 45

## 2025-02-12 ASSESSMENT — ENCOUNTER SYMPTOMS
ROS GI COMMENTS: SEE HPI
SHORTNESS OF BREATH: 0

## 2025-02-12 ASSESSMENT — COLUMBIA-SUICIDE SEVERITY RATING SCALE - C-SSRS
1. IN THE PAST MONTH, HAVE YOU WISHED YOU WERE DEAD OR WISHED YOU COULD GO TO SLEEP AND NOT WAKE UP?: NO
6. HAVE YOU EVER DONE ANYTHING, STARTED TO DO ANYTHING, OR PREPARED TO DO ANYTHING TO END YOUR LIFE?: NO
2. HAVE YOU ACTUALLY HAD ANY THOUGHTS OF KILLING YOURSELF IN THE PAST MONTH?: NO

## 2025-02-12 NOTE — ED TRIAGE NOTES
Pt presents with c/o right upper abd pain that began about 5 days ago. Pt states he has had his gall bladder removed, appendix removed, hx of kidney stones and has crohns. Pain is worse with movement.

## 2025-02-12 NOTE — ED PROVIDER NOTES
History     Chief Complaint   Patient presents with    Abdominal Pain     The history is provided by the patient.     Wilfrid Liao is a 74 year old male who presented to the emergency department ambulatory along with wife for evaluation of right-sided abdominal pain.  Past medical history is most significant for cholecystectomy as well as appendectomy.  The patient is also underwent bowel resection status post Crohn's.  Currently he is not taking any immunosuppressants.  Reports that right upper quadrant/epigastric pain began approximately 5 days ago after lifting.  Asymptomatic at rest.  Is significantly worse with deep breath and movement.  No chest pain.  No vomiting.  No hematochezia or melena.  Currently denies pain.    Allergies:  Allergies   Allergen Reactions    Atorvastatin GI Disturbance     vomiting    Atorvastatin Calcium Other (See Comments)     Lipitor  Myalgia    Morphine Other (See Comments)       Problem List:    Patient Active Problem List    Diagnosis Date Noted    Fatty liver 09/05/2018     Priority: Medium    Abnormal liver function tests 12/07/2017     Priority: Medium    Other specified abnormal findings of blood chemistry 12/24/2016     Priority: Medium    Cobalamin deficiency 12/20/2016     Priority: Medium    Bilateral tinnitus 10/14/2016     Priority: Medium    Hard of hearing, bilateral 10/14/2016     Priority: Medium    Crohn's disease of small intestine with complication (H) 02/05/2016     Priority: Medium    Rotator cuff tear arthropathy of right shoulder 07/08/2015     Priority: Medium    Gastroenteritis 01/25/2015     Priority: Medium    Primary hypertension 11/10/2014     Priority: Medium    Crohn's disease (H) 06/27/2014     Priority: Medium    Chronic cholecystitis 01/07/2014     Priority: Medium    Essential hypertension 02/23/2012     Priority: Medium     Problem list name updated by automated process. Provider to review      Hyperlipidemia 02/23/2012     Priority: Medium      Problem list name updated by automated process. Provider to review          Past Medical History:    Past Medical History:   Diagnosis Date    Bell's palsy 02/23/2012    Crohn's disease (H) 6/27/2014    Diverticulosis of colon  02/23/2012    Eczema 02/23/2012    HTN (Hypertension) 02/23/2012    Hyperlipidemia 02/23/2012    Inguinal hernia without mention of obstruction or gangrene, unilateral or unspecified, (not specified as recurrent) 01/17/2006       Past Surgical History:    Past Surgical History:   Procedure Laterality Date    COLONOSCOPY  2000    COLONOSCOPY  2011    REPEAT 2021    COLONOSCOPY  4/23/2014    HERNIA REPAIR, INGUINAL RT/LT      LEFT    LAPAROSCOPIC CHOLECYSTECTOMY  12/26/2013    Procedure: LAPAROSCOPIC CHOLECYSTECTOMY;  LAPAROSCOPIC CHOLECYSTECTOMY;  Surgeon: Tiffany Matamoros DO;  Location: [facility]       Family History:    Family History   Problem Relation Age of Onset    Colon Polyps Brother     Diabetes Brother     Heart Disease Brother         Myocardial Infarction    Hypertension Sister        Social History:  Marital Status:   [2]  Social History     Tobacco Use    Smoking status: Former     Types: Cigarettes    Tobacco comments:     Tried to Quit (YES)   Substance Use Topics    Alcohol use: No        Medications:    cyanocobalamin (VITAMIN B12) 1000 MCG/ML injection  losartan (COZAAR) 25 MG tablet  Multiple Vitamins-Minerals (MULTIVITAMIN OR)  Probiotic Product (PROBIOTIC FORMULA) CAPS  Turmeric, Curcuma Longa, (CURCUMIN) POWD  VITAMIN D, CHOLECALCIFEROL, PO          Review of Systems   Respiratory:  Negative for shortness of breath.    Cardiovascular:  Negative for chest pain.   Gastrointestinal:         See HPI       Physical Exam   BP: (!) 150/90  Pulse: 70  Temp: 97.6  F (36.4  C)  Resp: 16  Weight: 94.3 kg (208 lb)  SpO2: 96 %      Physical Exam  Vitals and nursing note reviewed.   Constitutional:       General: He is not in acute distress.     Appearance: Normal appearance.  He is not ill-appearing, toxic-appearing or diaphoretic.      Comments: Pleasant and talkative 74-year-old male found semireclined in no distress   Pulmonary:      Effort: Pulmonary effort is normal.   Abdominal:      Palpations: Abdomen is soft.      Comments: Mild increasing pain upon deep palpation of the right upper quadrant and epigastric area.  No masses appreciated.   Skin:     General: Skin is warm and dry.      Capillary Refill: Capillary refill takes less than 2 seconds.   Neurological:      General: No focal deficit present.      Mental Status: He is alert and oriented to person, place, and time.         ED Course     ED Course as of 02/12/25 1854 Wed Feb 12, 2025 1852 Broad differential diagnosis considered includes but is not limited to small bowel obstruction, aortic dissection, gastritis, peptic ulcer disease, pancreatitis, exacerbation of underlying inflammatory bowel disease, musculoskeletal source, acute coronary syndrome.     Procedures              Critical Care time:  none              Results for orders placed or performed during the hospital encounter of 02/12/25 (from the past 24 hours)   CBC with platelets differential    Narrative    The following orders were created for panel order CBC with platelets differential.  Procedure                               Abnormality         Status                     ---------                               -----------         ------                     CBC with platelets and d...[[id number]]  Abnormal            Final result                 Please view results for these tests on the individual orders.   Comprehensive metabolic panel   Result Value Ref Range    Sodium 141 135 - 145 mmol/L    Potassium 3.8 3.4 - 5.3 mmol/L    Carbon Dioxide (CO2) 20 (L) 22 - 29 mmol/L    Anion Gap 14 7 - 15 mmol/L    Urea Nitrogen 9.0 8.0 - 23.0 mg/dL    Creatinine 1.12 0.67 - 1.17 mg/dL    GFR Estimate 70 >60 mL/min/1.73m2    Calcium 8.9 8.8 - 10.4 mg/dL    Chloride 107  98 - 107 mmol/L    Glucose 111 (H) 70 - 99 mg/dL    Alkaline Phosphatase 86 40 - 150 U/L    AST 59 (H) 0 - 45 U/L    ALT 67 0 - 70 U/L    Protein Total 6.8 6.4 - 8.3 g/dL    Albumin 4.2 3.5 - 5.2 g/dL    Bilirubin Total 0.7 <=1.2 mg/dL   Lipase   Result Value Ref Range    Lipase 60 13 - 60 U/L   CBC with platelets and differential   Result Value Ref Range    WBC Count 8.9 4.0 - 11.0 10e3/uL    RBC Count 4.49 4.40 - 5.90 10e6/uL    Hemoglobin 13.9 13.3 - 17.7 g/dL    Hematocrit 39.9 (L) 40.0 - 53.0 %    MCV 89 78 - 100 fL    MCH 31.0 26.5 - 33.0 pg    MCHC 34.8 31.5 - 36.5 g/dL    RDW 12.8 10.0 - 15.0 %    Platelet Count 195 150 - 450 10e3/uL    % Neutrophils 54 %    % Lymphocytes 32 %    % Monocytes 9 %    % Eosinophils 4 %    % Basophils 1 %    % Immature Granulocytes 1 %    NRBCs per 100 WBC 0 <1 /100    Absolute Neutrophils 4.8 1.6 - 8.3 10e3/uL    Absolute Lymphocytes 2.8 0.8 - 5.3 10e3/uL    Absolute Monocytes 0.8 0.0 - 1.3 10e3/uL    Absolute Eosinophils 0.3 0.0 - 0.7 10e3/uL    Absolute Basophils 0.1 0.0 - 0.2 10e3/uL    Absolute Immature Granulocytes 0.1 <=0.4 10e3/uL    Absolute NRBCs 0.0 10e3/uL   Extra Tube    Narrative    The following orders were created for panel order Extra Tube.  Procedure                               Abnormality         Status                     ---------                               -----------         ------                     Extra Blue Top Tube[[id number]]                              Final result               Extra Red Top Tube[[id number]]                               Final result               Extra Heparinized Syringe[[id number]]                        Final result                 Please view results for these tests on the individual orders.   Extra Blue Top Tube   Result Value Ref Range    Hold Specimen JIC    Extra Red Top Tube   Result Value Ref Range    Hold Specimen JIC    Extra Heparinized Syringe   Result Value Ref Range    Hold Specimen OK    CT Abdomen Pelvis w  Contrast    Narrative    EXAM: CT ABDOMEN PELVIS W CONTRAST  LOCATION: [facility]  DATE: 2/12/2025    INDICATION: Right sided epigastric pain; right upper quadrant pain for 5 days, worse with movement. History of Crohn's disease.  COMPARISON: 12/24/2015  TECHNIQUE: CT scan of the abdomen and pelvis was performed following injection of IV contrast. Multiplanar reformats were obtained. Dose reduction techniques were used.  CONTRAST: isovue 370 102mL    FINDINGS:   LOWER CHEST: No basilar infiltrates.    HEPATOBILIARY: Slight increase in size of inferior right hepatic lobe cyst or hemangioma. No biliary dilatation. Cholecystectomy.    PANCREAS: Unremarkable    SPLEEN: Unremarkable    ADRENAL GLANDS: Unremarkable    KIDNEYS/BLADDER: No hydronephrosis. Increase in size of right renal low-attenuation lesion, 2.8 cm, measuring 34 Hounsfield units postcontrast, indeterminate. 2 mm nonobstructing mid left renal calculus. Bladder is thick-walled but decompressed.    BOWEL: Cecal anastomotic suture line. Mild wall thickening in the terminal ileum with adjacent mesenteric stranding (, sign) without fluid collections. No small bowel obstruction. Uncomplicated colonic diverticulosis. No CT evidence of perianal disease.    LYMPH NODES: Enlarged portacaval node is unchanged. No other significant retroperitoneal adenopathy.    VASCULATURE: No abdominal aortic aneurysm. Patent portal vein. Mild proximal SMA stenosis.    PELVIC ORGANS: Moderate prostatic hypertrophy. No free fluid.    MUSCULOSKELETAL: Mild multilevel degenerative change.      Impression    IMPRESSION:   1.  Mild wall thickening and adjacent inflammatory stranding in the terminal ileum compatible with active Crohn's disease. No bowel obstruction.  2.  Indeterminate right renal lesion may represent a hemorrhagic cyst. An attempt at further characterization with ultrasound versus definitive evaluation with CT or MRI recommended..     UA with Microscopic  reflex to Culture    Specimen: Urine, Midstream   Result Value Ref Range    Color Urine Yellow Colorless, Straw, Light Yellow, Yellow    Appearance Urine Clear Clear    Glucose Urine Negative Negative mg/dL    Bilirubin Urine Negative Negative    Ketones Urine Negative Negative mg/dL    Specific Gravity Urine 1.020 1.003 - 1.035    Blood Urine Negative Negative    pH Urine 5.5 4.7 - 8.0    Protein Albumin Urine Negative Negative mg/dL    Urobilinogen Urine Normal Normal, 2.0 mg/dL    Nitrite Urine Negative Negative    Leukocyte Esterase Urine Negative Negative    Mucus Urine Present (A) None Seen /LPF    RBC Urine <1 <=2 /HPF    WBC Urine 1 <=5 /HPF    Squamous Epithelials Urine 0 <=1 /HPF    Narrative    Urine Culture not indicated       Medications   iopamidol (ISOVUE-370) solution 102 mL (102 mLs Intravenous $Given 2/12/25 1749)   sodium chloride (PF) 0.9% PF flush 60 mL (60 mLs Intravenous $Given 2/12/25 1749)       Assessments & Plan (with Medical Decision Making)   This is a 74-year-old male with several days of epigastric/right upper quadrant abdominal pain.  He has undergone cholecystectomy in the past.  He has also undergone appendectomy.  Broad differential considered.  Broad workup initiated in the emergency department to include multiple serum test as well as cross-sectional imaging of the abdomen and pelvis.  Findings as above.  He looks well.  Patient declined pain medications and systemic steroids.  Close clinic follow-up was discussed.  Return precautions provided. Mr Liao had no further questions or concerns for me.     This document was prepared using a combination of typing and voice generated software.  While every attempt was made for accuracy, spelling and grammatical errors may exist.     I have reviewed the nursing notes.    I have reviewed the findings, diagnosis, plan and need for follow up with the patient.           Medical Decision Making  The patient's presentation was of moderate  complexity (an undiagnosed new problem with uncertain prognosis).    The patient's evaluation involved:  ordering and/or review of 3+ test(s) in this encounter (labs and CT scan)    The patient's management necessitated moderate risk (IV contrast administration).        New Prescriptions    No medications on file       Final diagnoses:   Acute Crohn's disease without complication (H)       2/12/2025   [facility]       Maria C Wen PA-C  02/12/25 [id number]

## 2025-02-12 NOTE — ED NOTES
Pt presents with RUQ pain ongoing for the past 5 days, more intense today. Pt reports diarrhea, but no worse than normal as pt has hx of chron's. Pt denies back pain, no chest pain, no NV/fevers. Pt denies blood in stool, no urinary issues.

## 2025-02-13 ENCOUNTER — MEDICAL CORRESPONDENCE (OUTPATIENT)
Dept: MRI IMAGING | Facility: HOSPITAL | Age: 75
End: 2025-02-13

## 2025-02-13 NOTE — ED NOTES
Patient discharged to Home at this time. Patient given written and verbal discharge instructions regarding home care, follow-up, and medications. Patient verbalized understanding of all discharge instructions. Rest and hydration encouraged. Patient encouraged to return to the ED if they experience  new, worsening, or concerning symptoms.     Patient to follow-up with PCP this week.

## 2025-02-13 NOTE — DISCHARGE INSTRUCTIONS
Rest to stay hydrated.  Advance diet as tolerated.  Return to this emergency department for any new or worsening symptoms.  Follow-up in the clinic this week for recheck.

## 2025-02-26 ENCOUNTER — HOSPITAL ENCOUNTER (OUTPATIENT)
Dept: MRI IMAGING | Facility: HOSPITAL | Age: 75
Discharge: HOME | End: 2025-02-26
Attending: FAMILY MEDICINE
Payer: MEDICARE

## 2025-02-26 DIAGNOSIS — N28.1 RENAL CYST: ICD-10-CM

## 2025-02-26 PROCEDURE — 74183 MRI ABD W/O CNTR FLWD CNTR: CPT

## 2025-02-26 PROCEDURE — 255N000002 HC RX 255 OP 636: Performed by: RADIOLOGY

## 2025-02-26 PROCEDURE — A9585 GADOBUTROL INJECTION: HCPCS | Performed by: RADIOLOGY

## 2025-02-26 RX ORDER — GADOBUTROL 604.72 MG/ML
10 INJECTION INTRAVENOUS ONCE
Status: COMPLETED | OUTPATIENT
Start: 2025-02-26 | End: 2025-02-26

## 2025-02-26 RX ADMIN — GADOBUTROL 10 ML: 604.72 INJECTION INTRAVENOUS at 15:44

## 2025-07-02 ENCOUNTER — MEDICAL CORRESPONDENCE (OUTPATIENT)
Dept: CT IMAGING | Facility: HOSPITAL | Age: 75
End: 2025-07-02

## 2025-07-03 ENCOUNTER — HOSPITAL ENCOUNTER (OUTPATIENT)
Dept: CT IMAGING | Facility: HOSPITAL | Age: 75
End: 2025-07-03
Attending: FAMILY MEDICINE
Payer: MEDICARE

## 2025-07-03 ENCOUNTER — APPOINTMENT (OUTPATIENT)
Dept: LAB | Facility: HOSPITAL | Age: 75
End: 2025-07-03
Attending: FAMILY MEDICINE
Payer: MEDICARE

## 2025-07-03 DIAGNOSIS — I71.22 ANEURYSM OF THE AORTIC ARCH, WITHOUT RUPTURE: ICD-10-CM

## 2025-07-03 LAB
CREAT BLD-MCNC: 1.1 MG/DL (ref 0.7–1.2)
EGFRCR SERPLBLD CKD-EPI 2021: >60 ML/MIN/1.73M2

## 2025-07-03 PROCEDURE — 71275 CT ANGIOGRAPHY CHEST: CPT | Mod: 26 | Performed by: RADIOLOGY

## 2025-07-03 PROCEDURE — 82565 ASSAY OF CREATININE: CPT

## 2025-07-03 PROCEDURE — 71275 CT ANGIOGRAPHY CHEST: CPT

## 2025-07-03 PROCEDURE — 250N000011 HC RX IP 250 OP 636: Performed by: RADIOLOGY

## 2025-07-03 RX ORDER — IOPAMIDOL 755 MG/ML
100 INJECTION, SOLUTION INTRAVASCULAR ONCE
Status: COMPLETED | OUTPATIENT
Start: 2025-07-03 | End: 2025-07-03

## 2025-07-03 RX ADMIN — IOPAMIDOL 100 ML: 755 INJECTION, SOLUTION INTRAVENOUS at 10:15
